# Patient Record
Sex: FEMALE | Race: WHITE | NOT HISPANIC OR LATINO | URBAN - METROPOLITAN AREA
[De-identification: names, ages, dates, MRNs, and addresses within clinical notes are randomized per-mention and may not be internally consistent; named-entity substitution may affect disease eponyms.]

---

## 2023-12-07 ENCOUNTER — LAB (OUTPATIENT)
Dept: LAB | Facility: LAB | Age: 88
End: 2023-12-07
Payer: MEDICARE

## 2023-12-07 DIAGNOSIS — R63.1 INCREASED THIRST: ICD-10-CM

## 2023-12-07 DIAGNOSIS — R06.02 SHORTNESS OF BREATH: ICD-10-CM

## 2023-12-07 DIAGNOSIS — R53.83 OTHER FATIGUE: ICD-10-CM

## 2023-12-07 DIAGNOSIS — I10 HYPERTENSION, ESSENTIAL: ICD-10-CM

## 2023-12-07 DIAGNOSIS — R35.0 URINARY FREQUENCY: ICD-10-CM

## 2023-12-07 LAB
ALBUMIN SERPL BCP-MCNC: 3.8 G/DL (ref 3.4–5)
ALP SERPL-CCNC: 49 U/L (ref 33–136)
ALT SERPL W P-5'-P-CCNC: 10 U/L (ref 7–45)
ANION GAP SERPL CALC-SCNC: 15 MMOL/L (ref 10–20)
AST SERPL W P-5'-P-CCNC: 15 U/L (ref 9–39)
BILIRUB SERPL-MCNC: 0.4 MG/DL (ref 0–1.2)
BUN SERPL-MCNC: 24 MG/DL (ref 6–23)
CALCIUM SERPL-MCNC: 9.5 MG/DL (ref 8.6–10.3)
CHLORIDE SERPL-SCNC: 103 MMOL/L (ref 98–107)
CO2 SERPL-SCNC: 30 MMOL/L (ref 21–32)
CREAT SERPL-MCNC: 0.9 MG/DL (ref 0.5–1.05)
ERYTHROCYTE [DISTWIDTH] IN BLOOD BY AUTOMATED COUNT: 13.6 % (ref 11.5–14.5)
GFR SERPL CREATININE-BSD FRML MDRD: 60 ML/MIN/1.73M*2
GLUCOSE SERPL-MCNC: 115 MG/DL (ref 74–99)
HCT VFR BLD AUTO: 41.3 % (ref 36–46)
HGB BLD-MCNC: 12.8 G/DL (ref 12–16)
MCH RBC QN AUTO: 28.6 PG (ref 26–34)
MCHC RBC AUTO-ENTMCNC: 31 G/DL (ref 32–36)
MCV RBC AUTO: 92 FL (ref 80–100)
NRBC BLD-RTO: 0 /100 WBCS (ref 0–0)
PLATELET # BLD AUTO: 321 X10*3/UL (ref 150–450)
POTASSIUM SERPL-SCNC: 4.5 MMOL/L (ref 3.5–5.3)
PROT SERPL-MCNC: 6.5 G/DL (ref 6.4–8.2)
RBC # BLD AUTO: 4.47 X10*6/UL (ref 4–5.2)
SODIUM SERPL-SCNC: 143 MMOL/L (ref 136–145)
TSH SERPL-ACNC: 1.87 MIU/L (ref 0.44–3.98)
WBC # BLD AUTO: 8.1 X10*3/UL (ref 4.4–11.3)

## 2023-12-07 PROCEDURE — 36415 COLL VENOUS BLD VENIPUNCTURE: CPT

## 2023-12-07 PROCEDURE — 84443 ASSAY THYROID STIM HORMONE: CPT

## 2023-12-07 PROCEDURE — 83036 HEMOGLOBIN GLYCOSYLATED A1C: CPT

## 2023-12-07 PROCEDURE — 85027 COMPLETE CBC AUTOMATED: CPT

## 2023-12-07 PROCEDURE — 80053 COMPREHEN METABOLIC PANEL: CPT

## 2023-12-07 PROCEDURE — 84436 ASSAY OF TOTAL THYROXINE: CPT

## 2023-12-08 LAB
EST. AVERAGE GLUCOSE BLD GHB EST-MCNC: 134 MG/DL
HBA1C MFR BLD: 6.3 %
T4 SERPL-MCNC: 9.6 UG/DL (ref 4.5–11.1)

## 2024-02-19 ENCOUNTER — OFFICE VISIT (OUTPATIENT)
Age: 89
End: 2024-02-19
Payer: MEDICARE

## 2024-02-19 VITALS
BODY MASS INDEX: 30.24 KG/M2 | HEIGHT: 65 IN | DIASTOLIC BLOOD PRESSURE: 76 MMHG | SYSTOLIC BLOOD PRESSURE: 147 MMHG | TEMPERATURE: 97.2 F | WEIGHT: 181.5 LBS | HEART RATE: 62 BPM

## 2024-02-19 DIAGNOSIS — G40.919 INTRACTABLE EPILEPSY WITHOUT STATUS EPILEPTICUS, UNSPECIFIED EPILEPSY TYPE (HCC): ICD-10-CM

## 2024-02-19 DIAGNOSIS — G30.1 MODERATE LATE ONSET ALZHEIMER'S DEMENTIA WITHOUT BEHAVIORAL DISTURBANCE, PSYCHOTIC DISTURBANCE, MOOD DISTURBANCE, OR ANXIETY (HCC): Primary | ICD-10-CM

## 2024-02-19 DIAGNOSIS — R20.0 NUMBNESS IN BOTH LEGS: ICD-10-CM

## 2024-02-19 DIAGNOSIS — F02.B0 MODERATE LATE ONSET ALZHEIMER'S DEMENTIA WITHOUT BEHAVIORAL DISTURBANCE, PSYCHOTIC DISTURBANCE, MOOD DISTURBANCE, OR ANXIETY (HCC): Primary | ICD-10-CM

## 2024-02-19 PROCEDURE — 1123F ACP DISCUSS/DSCN MKR DOCD: CPT | Performed by: PSYCHIATRY & NEUROLOGY

## 2024-02-19 PROCEDURE — 99214 OFFICE O/P EST MOD 30 MIN: CPT | Performed by: PSYCHIATRY & NEUROLOGY

## 2024-02-19 RX ORDER — APIXABAN 2.5 MG/1
2.5 TABLET, FILM COATED ORAL DAILY
COMMUNITY
Start: 2018-12-31

## 2024-02-19 RX ORDER — LEVETIRACETAM 750 MG/1
750 TABLET ORAL 2 TIMES DAILY
COMMUNITY
Start: 2023-06-27 | End: 2024-02-19 | Stop reason: SDUPTHER

## 2024-02-19 RX ORDER — CETIRIZINE HYDROCHLORIDE 10 MG/1
10 TABLET ORAL DAILY
COMMUNITY
Start: 2015-05-20

## 2024-02-19 RX ORDER — NITROFURANTOIN MACROCRYSTALS 100 MG/1
CAPSULE ORAL
COMMUNITY
Start: 2024-01-15

## 2024-02-19 RX ORDER — LEVETIRACETAM 750 MG/1
750 TABLET ORAL 2 TIMES DAILY
Qty: 180 TABLET | Refills: 3 | Status: SHIPPED | OUTPATIENT
Start: 2024-02-19

## 2024-02-19 RX ORDER — MEMANTINE HYDROCHLORIDE AND DONEPEZIL HYDROCHLORIDE 28; 10 MG/1; MG/1
1 CAPSULE ORAL DAILY
COMMUNITY
Start: 2023-06-27 | End: 2024-02-19 | Stop reason: SDUPTHER

## 2024-02-19 RX ORDER — LISINOPRIL AND HYDROCHLOROTHIAZIDE 20; 12.5 MG/1; MG/1
TABLET ORAL
COMMUNITY
Start: 2018-12-05

## 2024-02-19 RX ORDER — AMLODIPINE BESYLATE 5 MG/1
5 TABLET ORAL DAILY
COMMUNITY

## 2024-02-19 RX ORDER — METOPROLOL SUCCINATE 100 MG/1
TABLET, EXTENDED RELEASE ORAL
COMMUNITY
Start: 2018-10-07

## 2024-02-19 RX ORDER — MEMANTINE HYDROCHLORIDE AND DONEPEZIL HYDROCHLORIDE 28; 10 MG/1; MG/1
1 CAPSULE ORAL DAILY
Qty: 90 CAPSULE | Refills: 3 | Status: SHIPPED | OUTPATIENT
Start: 2024-02-19

## 2024-02-19 ASSESSMENT — MINI MENTAL STATE EXAM
WHAT DAY OF THE WEEK IS THIS?: 1
WHAT IS TODAY'S DATE?: 0
WHAT STATE [OR PROVINCE] ARE WE IN?: 1
WHICH SEASON IS THIS?: 0
SAY: PUT THE PAPER DOWN ON THE FLOOR, SCORE IF PAPER IS PLACED BACK ON FLOOR: 1
WHAT MONTH IS THIS?: 0
ASK THE PERSON IF HE IS RIGHT OR LEFT-HANDED. TAKE A PIECE OF PAPER AND HOLD IT UP IN
FRONT OF THE PERSON. SAY: TAKE THIS PAPER IN YOUR RIGHT/LEFT HAND (WHICHEVER IS NON-
DOMINANT), SCORE IF PAPER IS PICKED UP IN CORRECT HAND.: 1
WHAT FLOOR ARE WE ON [IN FACILITY]?/ WHAT ROOM ARE WE IN [IN HOME]?: 1
NOW WHAT WERE THE THREE OBJECTS I ASKED YOU TO REMEMBER?: 0
SAY: I WOULD LIKE YOU TO COUNT BACKWARD FROM 100 BY SEVENS: 4
SHOW: WRISTWATCH [OBJECT] ASK: WHAT IS THIS CALLED?: 1
SAY: FOLD THE PAPER IN HALF ONCE WITH BOTH HANDS, SCORE IF PAPER IS CORRECTLY FOLDED IN HALF.: 1
HAND THE PERSON A PENCIL AND PAPER. SAY: WRITE ANY COMPLETE SENTENCE ON THAT
PIECE OF PAPER. (NOTE: THE SENTENCE MUST MAKE SENSE. IGNORE SPELLING ERRORS): 1
PLACE DESIGN, ERASER AND PENCIL IN FRONT OF THE PERSON.  SAY:  COPY THIS DESIGN PLEASE.  SHOW: DESIGN. ALLOW: MULTIPLE TRIES. WAIT UNTIL PERSON IS FINISHED AND HANDS IT BACK. SCORE: ONLY FOR DIAGRAM WITH 4-SIDED FIGURE BETWEEN TWO 5-SIDED FIGURES: 0
SHOW: PENCIL [OBJECT] ASK: WHAT IS THIS CALLED?: 1
WHAT CITY/TOWN ARE WE IN?: 0
WHAT YEAR IS THIS?: 0
SAY: I WOULD LIKE YOU TO REPEAT THIS PHRASE AFTER ME: NO IFS, ANDS, OR BUTS.: 1
SAY: I AM GOING TO NAME THREE OBJECTS. WHEN I AM FINISHED, I WANT YOU TO REPEAT
THEM. REMEMBER WHAT THEY ARE BECAUSE I AM GOING TO ASK YOU TO NAME THEM AGAIN IN
A FEW MINUTES.  SAY THE FOLLOWING WORDS SLOWLY AT 1-SECOND INTERVALS - BALL/ CAR/ MAN [ITERATIONS FOR REPEAT ADMINISTRATION]: 3
SUM ALL MMSE QUESTIONS FOR TOTAL SCORE [OUT OF 30].: 19
SAY: READ THE WORDS ON THE PAGE AND THEN DO WHAT IT SAYS. THEN HAND THE PERSON
THE SHEET WITH CLOSE YOUR EYES ON IT. IF THE SUBJECT READS AND DOES NOT CLOSE THEIR EYES, REPEAT UP TO THREE TIMES. SCORE ONLY IF SUBJECT CLOSES EYES.: 1
WHAT COUNTRY ARE WE IN?: 1
WHAT IS THE NAME OF THIS BUILDING [IN FACILITY]?/WHAT IS THE STREET ADDRESS OF THIS HOUSE [IN HOME]?: 0

## 2024-02-19 NOTE — PROGRESS NOTES
Tamra Lr MD       Latisha Streeter is a 89 y.o. 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  Appetite better after constipation is fixed.  Getting meals on wheels    Current Medications being taken: namzaric 28/10 mg q daily  Mvi, b12.  Component Latest Ref Rng & Units 8/24/2021  Prealbumin 17.6 - 36.0 mg/dL 30.7    Any Improvement: stable. Playing solitaire  Getting aide- bathing, dressing  Warms food in microwaving    Any Side Effects: NO  Which of the following Activities of Daily Living is the patient able to complete:able to complete activities of daily living- does not want to be groomed except when she steps out of home.  Which of the following Instrumental Activities is the patient able to complete: not driving due to family's request, able to dial a telephone, eats readymade meals mostly, gets meals on meals.  Cameras on the house.  Pill container organized by family and she takes it mostly    Patient lives: independently  Home health comes in 3/week  The patient lives:in a single home  History of Any Mood Changes: normal per patient. On conversation seems short and angry.  History of Any Personality Changes: No  History of Any Problem with Sleep: not using cpap.  - sleeping ok     History of Any Visual Hallucination: no  History of Any Weakness:None  History of Any Numbness:c/o tingling in feet  History of Seizures: yes.  History of Tremors:None  History of Rigidity:None.  Weight stable.  Falls: none      Epilepsy:  Date of last seizure: possibly in may 2014  Unsure if shes taking the keppra or not    Breakthrough episode: ?? On May 10th 2015

## 2024-02-20 LAB
FOLATE: 18.6 NG/ML (ref 4.8–24.2)
VITAMIN B-12: >2000 PG/ML (ref 211–946)

## 2024-02-21 LAB
ALBUMIN (CALCULATED): 3.2 G/DL (ref 3.5–4.7)
ALPHA-1-GLOBULIN: 0.3 G/DL (ref 0.2–0.4)
ALPHA-2-GLOBULIN: 0.9 G/DL (ref 0.5–1)
BETA GLOBULIN: 1.3 G/DL (ref 0.8–1.3)
GAMMA GLOBULIN: 1 G/DL (ref 0.7–1.6)
KEPPRA: 28 UG/ML
M SPIKE 1 SERUM PROTEIN ELEC: 0.3 G/DL
PATHOLOGIST REVIEW: NORMAL
PATHOLOGIST: ABNORMAL
PROTEIN ELECTROPHORESIS, SERUM: ABNORMAL
SERUM IFX INTERP: NORMAL
TOTAL PROTEIN: 6.7 G/DL (ref 6.4–8.3)

## 2024-02-23 LAB — VITAMIN B1 WHOLE BLOOD: 141 NMOL/L (ref 70–180)

## 2024-03-11 ENCOUNTER — TELEPHONE (OUTPATIENT)
Age: 89
End: 2024-03-11

## 2024-03-11 DIAGNOSIS — D47.2 MGUS (MONOCLONAL GAMMOPATHY OF UNKNOWN SIGNIFICANCE): Primary | ICD-10-CM

## 2024-07-24 ENCOUNTER — HOSPITAL ENCOUNTER (OUTPATIENT)
Dept: RADIOLOGY | Facility: HOSPITAL | Age: 89
Discharge: HOME | End: 2024-07-24
Payer: MEDICARE

## 2024-07-24 DIAGNOSIS — R06.00 DYSPNEA: ICD-10-CM

## 2024-07-24 PROCEDURE — 71046 X-RAY EXAM CHEST 2 VIEWS: CPT | Performed by: RADIOLOGY

## 2024-07-24 PROCEDURE — 71046 X-RAY EXAM CHEST 2 VIEWS: CPT

## 2024-08-08 ENCOUNTER — HOSPITAL ENCOUNTER (INPATIENT)
Facility: HOSPITAL | Age: 89
End: 2024-08-08
Attending: EMERGENCY MEDICINE | Admitting: INTERNAL MEDICINE
Payer: MEDICARE

## 2024-08-08 ENCOUNTER — APPOINTMENT (OUTPATIENT)
Dept: CARDIOLOGY | Facility: HOSPITAL | Age: 89
End: 2024-08-08
Payer: MEDICARE

## 2024-08-08 ENCOUNTER — APPOINTMENT (OUTPATIENT)
Dept: RADIOLOGY | Facility: HOSPITAL | Age: 89
End: 2024-08-08
Payer: MEDICARE

## 2024-08-08 DIAGNOSIS — J18.9 COMMUNITY ACQUIRED PNEUMONIA, UNSPECIFIED LATERALITY: ICD-10-CM

## 2024-08-08 DIAGNOSIS — K92.2 LOWER GI BLEEDING: ICD-10-CM

## 2024-08-08 DIAGNOSIS — J18.9 SEVERE PNEUMONIA: Primary | ICD-10-CM

## 2024-08-08 DIAGNOSIS — K52.9 COLITIS: ICD-10-CM

## 2024-08-08 DIAGNOSIS — R53.1 GENERALIZED WEAKNESS: ICD-10-CM

## 2024-08-08 DIAGNOSIS — J18.9 PNEUMONIA DUE TO ORGANISM: ICD-10-CM

## 2024-08-08 LAB
ALBUMIN SERPL BCP-MCNC: 3.8 G/DL (ref 3.4–5)
ALP SERPL-CCNC: 48 U/L (ref 33–136)
ALT SERPL W P-5'-P-CCNC: 10 U/L (ref 7–45)
ANION GAP BLDV CALCULATED.4IONS-SCNC: 7 MMOL/L (ref 10–25)
ANION GAP SERPL CALC-SCNC: 14 MMOL/L (ref 10–20)
AST SERPL W P-5'-P-CCNC: 18 U/L (ref 9–39)
BASE EXCESS BLDV CALC-SCNC: 3.2 MMOL/L (ref -2–3)
BASOPHILS # BLD AUTO: 0.04 X10*3/UL (ref 0–0.1)
BASOPHILS NFR BLD AUTO: 0.5 %
BILIRUB SERPL-MCNC: 0.6 MG/DL (ref 0–1.2)
BNP SERPL-MCNC: 27 PG/ML (ref 0–99)
BODY TEMPERATURE: ABNORMAL
BUN SERPL-MCNC: 27 MG/DL (ref 6–23)
CA-I BLDV-SCNC: 1.23 MMOL/L (ref 1.1–1.33)
CALCIUM SERPL-MCNC: 9.8 MG/DL (ref 8.6–10.3)
CARDIAC TROPONIN I PNL SERPL HS: 7 NG/L (ref 0–13)
CHLORIDE BLDV-SCNC: 103 MMOL/L (ref 98–107)
CHLORIDE SERPL-SCNC: 99 MMOL/L (ref 98–107)
CO2 SERPL-SCNC: 25 MMOL/L (ref 21–32)
CREAT SERPL-MCNC: 1.19 MG/DL (ref 0.5–1.05)
EGFRCR SERPLBLD CKD-EPI 2021: 42 ML/MIN/1.73M*2
EOSINOPHIL # BLD AUTO: 0.09 X10*3/UL (ref 0–0.4)
EOSINOPHIL NFR BLD AUTO: 1.1 %
ERYTHROCYTE [DISTWIDTH] IN BLOOD BY AUTOMATED COUNT: 13.8 % (ref 11.5–14.5)
GLUCOSE BLD MANUAL STRIP-MCNC: 169 MG/DL (ref 74–99)
GLUCOSE BLDV-MCNC: 193 MG/DL (ref 74–99)
GLUCOSE SERPL-MCNC: 211 MG/DL (ref 74–99)
HCO3 BLDV-SCNC: 30.2 MMOL/L (ref 22–26)
HCT VFR BLD AUTO: 40.3 % (ref 36–46)
HCT VFR BLD EST: 38 % (ref 36–46)
HGB BLD-MCNC: 12.9 G/DL (ref 12–16)
HGB BLDV-MCNC: 12.8 G/DL (ref 12–16)
HOLD SPECIMEN: NORMAL
IMM GRANULOCYTES # BLD AUTO: 0.02 X10*3/UL (ref 0–0.5)
IMM GRANULOCYTES NFR BLD AUTO: 0.3 % (ref 0–0.9)
INHALED O2 CONCENTRATION: 0 %
LACTATE BLDV-SCNC: 2.4 MMOL/L (ref 0.4–2)
LYMPHOCYTES # BLD AUTO: 3.77 X10*3/UL (ref 0.8–3)
LYMPHOCYTES NFR BLD AUTO: 47.1 %
MCH RBC QN AUTO: 28.2 PG (ref 26–34)
MCHC RBC AUTO-ENTMCNC: 32 G/DL (ref 32–36)
MCV RBC AUTO: 88 FL (ref 80–100)
MONOCYTES # BLD AUTO: 0.4 X10*3/UL (ref 0.05–0.8)
MONOCYTES NFR BLD AUTO: 5 %
NEUTROPHILS # BLD AUTO: 3.68 X10*3/UL (ref 1.6–5.5)
NEUTROPHILS NFR BLD AUTO: 46 %
NRBC BLD-RTO: 0 /100 WBCS (ref 0–0)
OXYHGB MFR BLDV: 43.8 % (ref 45–75)
PCO2 BLDV: 56 MM HG (ref 41–51)
PH BLDV: 7.34 PH (ref 7.33–7.43)
PLATELET # BLD AUTO: 329 X10*3/UL (ref 150–450)
PO2 BLDV: 31 MM HG (ref 35–45)
POTASSIUM BLDV-SCNC: 4.3 MMOL/L (ref 3.5–5.3)
POTASSIUM SERPL-SCNC: 3.8 MMOL/L (ref 3.5–5.3)
PROT SERPL-MCNC: 7.2 G/DL (ref 6.4–8.2)
RBC # BLD AUTO: 4.57 X10*6/UL (ref 4–5.2)
SAO2 % BLDV: 45 % (ref 45–75)
SODIUM BLDV-SCNC: 136 MMOL/L (ref 136–145)
SODIUM SERPL-SCNC: 134 MMOL/L (ref 136–145)
WBC # BLD AUTO: 8 X10*3/UL (ref 4.4–11.3)

## 2024-08-08 PROCEDURE — 84132 ASSAY OF SERUM POTASSIUM: CPT | Performed by: EMERGENCY MEDICINE

## 2024-08-08 PROCEDURE — 36415 COLL VENOUS BLD VENIPUNCTURE: CPT | Performed by: EMERGENCY MEDICINE

## 2024-08-08 PROCEDURE — 2500000004 HC RX 250 GENERAL PHARMACY W/ HCPCS (ALT 636 FOR OP/ED): Performed by: EMERGENCY MEDICINE

## 2024-08-08 PROCEDURE — 71045 X-RAY EXAM CHEST 1 VIEW: CPT | Mod: FOREIGN READ | Performed by: RADIOLOGY

## 2024-08-08 PROCEDURE — 83880 ASSAY OF NATRIURETIC PEPTIDE: CPT | Performed by: EMERGENCY MEDICINE

## 2024-08-08 PROCEDURE — 85025 COMPLETE CBC W/AUTO DIFF WBC: CPT | Performed by: EMERGENCY MEDICINE

## 2024-08-08 PROCEDURE — 80053 COMPREHEN METABOLIC PANEL: CPT | Performed by: EMERGENCY MEDICINE

## 2024-08-08 PROCEDURE — 71045 X-RAY EXAM CHEST 1 VIEW: CPT

## 2024-08-08 PROCEDURE — 71260 CT THORAX DX C+: CPT

## 2024-08-08 PROCEDURE — 84484 ASSAY OF TROPONIN QUANT: CPT | Performed by: EMERGENCY MEDICINE

## 2024-08-08 PROCEDURE — 82947 ASSAY GLUCOSE BLOOD QUANT: CPT

## 2024-08-08 PROCEDURE — 93005 ELECTROCARDIOGRAM TRACING: CPT

## 2024-08-08 PROCEDURE — 99285 EMERGENCY DEPT VISIT HI MDM: CPT

## 2024-08-08 PROCEDURE — 74177 CT ABD & PELVIS W/CONTRAST: CPT

## 2024-08-08 RX ADMIN — SODIUM CHLORIDE 1000 ML: 9 INJECTION, SOLUTION INTRAVENOUS at 22:24

## 2024-08-08 ASSESSMENT — COLUMBIA-SUICIDE SEVERITY RATING SCALE - C-SSRS
1. IN THE PAST MONTH, HAVE YOU WISHED YOU WERE DEAD OR WISHED YOU COULD GO TO SLEEP AND NOT WAKE UP?: NO
6. HAVE YOU EVER DONE ANYTHING, STARTED TO DO ANYTHING, OR PREPARED TO DO ANYTHING TO END YOUR LIFE?: NO
2. HAVE YOU ACTUALLY HAD ANY THOUGHTS OF KILLING YOURSELF?: NO

## 2024-08-08 ASSESSMENT — PAIN - FUNCTIONAL ASSESSMENT: PAIN_FUNCTIONAL_ASSESSMENT: 0-10

## 2024-08-08 ASSESSMENT — PAIN SCALES - GENERAL: PAINLEVEL_OUTOF10: 0 - NO PAIN

## 2024-08-09 PROBLEM — J18.9 PNEUMONIA DUE TO ORGANISM: Status: ACTIVE | Noted: 2024-08-09

## 2024-08-09 LAB
ANION GAP SERPL CALC-SCNC: 13 MMOL/L (ref 10–20)
BNP SERPL-MCNC: 68 PG/ML (ref 0–99)
BUN SERPL-MCNC: 33 MG/DL (ref 6–23)
CALCIUM SERPL-MCNC: 8.2 MG/DL (ref 8.6–10.3)
CHLORIDE SERPL-SCNC: 101 MMOL/L (ref 98–107)
CO2 SERPL-SCNC: 25 MMOL/L (ref 21–32)
CREAT SERPL-MCNC: 1.3 MG/DL (ref 0.5–1.05)
EGFRCR SERPLBLD CKD-EPI 2021: 38 ML/MIN/1.73M*2
ERYTHROCYTE [DISTWIDTH] IN BLOOD BY AUTOMATED COUNT: 13.9 % (ref 11.5–14.5)
GLUCOSE SERPL-MCNC: 321 MG/DL (ref 74–99)
HCT VFR BLD AUTO: 39.5 % (ref 36–46)
HEMOCCULT SP1 STL QL: POSITIVE
HGB BLD-MCNC: 12.3 G/DL (ref 12–16)
LACTATE SERPL-SCNC: 3.1 MMOL/L (ref 0.4–2)
LACTATE SERPL-SCNC: 4.2 MMOL/L (ref 0.4–2)
MAGNESIUM SERPL-MCNC: 1.62 MG/DL (ref 1.6–2.4)
MCH RBC QN AUTO: 28 PG (ref 26–34)
MCHC RBC AUTO-ENTMCNC: 31.1 G/DL (ref 32–36)
MCV RBC AUTO: 90 FL (ref 80–100)
NRBC BLD-RTO: 0 /100 WBCS (ref 0–0)
PLATELET # BLD AUTO: 285 X10*3/UL (ref 150–450)
POTASSIUM SERPL-SCNC: 3.9 MMOL/L (ref 3.5–5.3)
PROCALCITONIN SERPL-MCNC: 0.91 NG/ML
RBC # BLD AUTO: 4.39 X10*6/UL (ref 4–5.2)
SODIUM SERPL-SCNC: 135 MMOL/L (ref 136–145)
WBC # BLD AUTO: 13 X10*3/UL (ref 4.4–11.3)

## 2024-08-09 PROCEDURE — 85027 COMPLETE CBC AUTOMATED: CPT | Mod: IPSPLIT

## 2024-08-09 PROCEDURE — 83880 ASSAY OF NATRIURETIC PEPTIDE: CPT | Mod: IPSPLIT

## 2024-08-09 PROCEDURE — 83735 ASSAY OF MAGNESIUM: CPT | Mod: IPSPLIT

## 2024-08-09 PROCEDURE — 71260 CT THORAX DX C+: CPT | Mod: FOREIGN READ | Performed by: RADIOLOGY

## 2024-08-09 PROCEDURE — 87040 BLOOD CULTURE FOR BACTERIA: CPT | Mod: CONLAB

## 2024-08-09 PROCEDURE — 2500000004 HC RX 250 GENERAL PHARMACY W/ HCPCS (ALT 636 FOR OP/ED): Mod: IPSPLIT | Performed by: NURSE PRACTITIONER

## 2024-08-09 PROCEDURE — 2550000001 HC RX 255 CONTRASTS: Performed by: EMERGENCY MEDICINE

## 2024-08-09 PROCEDURE — 94664 DEMO&/EVAL PT USE INHALER: CPT | Mod: IPSPLIT

## 2024-08-09 PROCEDURE — C9113 INJ PANTOPRAZOLE SODIUM, VIA: HCPCS

## 2024-08-09 PROCEDURE — 1100000001 HC PRIVATE ROOM DAILY: Mod: IPSPLIT

## 2024-08-09 PROCEDURE — 94760 N-INVAS EAR/PLS OXIMETRY 1: CPT | Mod: IPSPLIT

## 2024-08-09 PROCEDURE — 94640 AIRWAY INHALATION TREATMENT: CPT | Mod: IPSPLIT

## 2024-08-09 PROCEDURE — C9113 INJ PANTOPRAZOLE SODIUM, VIA: HCPCS | Mod: IPSPLIT

## 2024-08-09 PROCEDURE — 2500000004 HC RX 250 GENERAL PHARMACY W/ HCPCS (ALT 636 FOR OP/ED): Performed by: EMERGENCY MEDICINE

## 2024-08-09 PROCEDURE — 2500000002 HC RX 250 W HCPCS SELF ADMINISTERED DRUGS (ALT 637 FOR MEDICARE OP, ALT 636 FOR OP/ED): Mod: IPSPLIT | Performed by: INTERNAL MEDICINE

## 2024-08-09 PROCEDURE — 74177 CT ABD & PELVIS W/CONTRAST: CPT | Mod: FOREIGN READ | Performed by: RADIOLOGY

## 2024-08-09 PROCEDURE — 36415 COLL VENOUS BLD VENIPUNCTURE: CPT | Mod: IPSPLIT

## 2024-08-09 PROCEDURE — 82270 OCCULT BLOOD FECES: CPT | Performed by: EMERGENCY MEDICINE

## 2024-08-09 PROCEDURE — 83605 ASSAY OF LACTIC ACID: CPT | Mod: IPSPLIT

## 2024-08-09 PROCEDURE — 80048 BASIC METABOLIC PNL TOTAL CA: CPT | Mod: IPSPLIT

## 2024-08-09 PROCEDURE — 96374 THER/PROPH/DIAG INJ IV PUSH: CPT | Mod: 59,IPSPLIT

## 2024-08-09 PROCEDURE — 96375 TX/PRO/DX INJ NEW DRUG ADDON: CPT | Mod: IPSPLIT

## 2024-08-09 PROCEDURE — 96365 THER/PROPH/DIAG IV INF INIT: CPT | Mod: IPSPLIT

## 2024-08-09 PROCEDURE — 97162 PT EVAL MOD COMPLEX 30 MIN: CPT | Mod: GP,IPSPLIT

## 2024-08-09 PROCEDURE — 2500000004 HC RX 250 GENERAL PHARMACY W/ HCPCS (ALT 636 FOR OP/ED)

## 2024-08-09 PROCEDURE — 99222 1ST HOSP IP/OBS MODERATE 55: CPT | Performed by: NURSE PRACTITIONER

## 2024-08-09 PROCEDURE — 2500000004 HC RX 250 GENERAL PHARMACY W/ HCPCS (ALT 636 FOR OP/ED): Mod: IPSPLIT

## 2024-08-09 PROCEDURE — 9420000001 HC RT PATIENT EDUCATION 5 MIN: Mod: IPSPLIT

## 2024-08-09 PROCEDURE — 84145 PROCALCITONIN (PCT): CPT | Mod: CONLAB

## 2024-08-09 RX ORDER — IPRATROPIUM BROMIDE AND ALBUTEROL SULFATE 2.5; .5 MG/3ML; MG/3ML
3 SOLUTION RESPIRATORY (INHALATION) EVERY 2 HOUR PRN
Status: ACTIVE | OUTPATIENT
Start: 2024-08-09

## 2024-08-09 RX ORDER — SODIUM CHLORIDE, SODIUM LACTATE, POTASSIUM CHLORIDE, CALCIUM CHLORIDE 600; 310; 30; 20 MG/100ML; MG/100ML; MG/100ML; MG/100ML
75 INJECTION, SOLUTION INTRAVENOUS CONTINUOUS
Status: DISCONTINUED | OUTPATIENT
Start: 2024-08-09 | End: 2024-08-11

## 2024-08-09 RX ORDER — SODIUM CHLORIDE 9 MG/ML
INJECTION, SOLUTION INTRAVENOUS
Status: COMPLETED
Start: 2024-08-09 | End: 2024-08-09

## 2024-08-09 RX ORDER — AMLODIPINE BESYLATE 5 MG/1
5 TABLET ORAL
Status: ON HOLD | COMMUNITY

## 2024-08-09 RX ORDER — LISINOPRIL AND HYDROCHLOROTHIAZIDE 12.5; 2 MG/1; MG/1
1 TABLET ORAL DAILY
Status: ON HOLD | COMMUNITY

## 2024-08-09 RX ORDER — APIXABAN 2.5 MG/1
2.5 TABLET, FILM COATED ORAL 2 TIMES DAILY
Status: ON HOLD | COMMUNITY
Start: 2024-05-09

## 2024-08-09 RX ORDER — CEFTRIAXONE 2 G/50ML
2 INJECTION, SOLUTION INTRAVENOUS EVERY 24 HOURS
Status: DISCONTINUED | OUTPATIENT
Start: 2024-08-09 | End: 2024-08-11

## 2024-08-09 RX ORDER — AZITHROMYCIN 250 MG/1
500 TABLET, FILM COATED ORAL
Status: DISPENSED | OUTPATIENT
Start: 2024-08-10 | End: 2024-08-13

## 2024-08-09 RX ORDER — PANTOPRAZOLE SODIUM 40 MG/10ML
INJECTION, POWDER, LYOPHILIZED, FOR SOLUTION INTRAVENOUS
Status: COMPLETED
Start: 2024-08-09 | End: 2024-08-09

## 2024-08-09 RX ORDER — CEFTRIAXONE 1 G/50ML
1 INJECTION, SOLUTION INTRAVENOUS ONCE
Status: COMPLETED | OUTPATIENT
Start: 2024-08-09 | End: 2024-08-09

## 2024-08-09 RX ORDER — PANTOPRAZOLE SODIUM 40 MG/10ML
40 INJECTION, POWDER, LYOPHILIZED, FOR SOLUTION INTRAVENOUS DAILY
Status: DISPENSED | OUTPATIENT
Start: 2024-08-09

## 2024-08-09 RX ORDER — LEVETIRACETAM 750 MG/1
1 TABLET ORAL 2 TIMES DAILY
Status: ON HOLD | COMMUNITY
Start: 2023-06-27

## 2024-08-09 RX ORDER — METOPROLOL SUCCINATE 100 MG/1
1 TABLET, EXTENDED RELEASE ORAL DAILY
Status: ON HOLD | COMMUNITY
Start: 2016-01-14

## 2024-08-09 RX ORDER — IPRATROPIUM BROMIDE AND ALBUTEROL SULFATE 2.5; .5 MG/3ML; MG/3ML
3 SOLUTION RESPIRATORY (INHALATION)
Status: DISCONTINUED | OUTPATIENT
Start: 2024-08-09 | End: 2024-08-09

## 2024-08-09 RX ORDER — MEMANTINE HYDROCHLORIDE 10 MG/1
10 TABLET ORAL 2 TIMES DAILY
Status: ON HOLD | COMMUNITY
Start: 2016-01-14

## 2024-08-09 RX ORDER — DONEPEZIL HYDROCHLORIDE 10 MG/1
1 TABLET, FILM COATED ORAL DAILY
Status: ON HOLD | COMMUNITY
Start: 2016-01-14

## 2024-08-09 RX ORDER — METRONIDAZOLE 500 MG/100ML
500 INJECTION, SOLUTION INTRAVENOUS EVERY 8 HOURS
Status: DISCONTINUED | OUTPATIENT
Start: 2024-08-09 | End: 2024-08-11

## 2024-08-09 RX ORDER — IPRATROPIUM BROMIDE AND ALBUTEROL SULFATE 2.5; .5 MG/3ML; MG/3ML
3 SOLUTION RESPIRATORY (INHALATION) 3 TIMES DAILY
Status: DISCONTINUED | OUTPATIENT
Start: 2024-08-09 | End: 2024-08-10

## 2024-08-09 RX ADMIN — METRONIDAZOLE 500 MG: 5 INJECTION, SOLUTION INTRAVENOUS at 15:17

## 2024-08-09 RX ADMIN — CEFTRIAXONE 1 G: 1 INJECTION, SOLUTION INTRAVENOUS at 02:56

## 2024-08-09 RX ADMIN — AZITHROMYCIN MONOHYDRATE 500 MG: 500 INJECTION, POWDER, LYOPHILIZED, FOR SOLUTION INTRAVENOUS at 05:38

## 2024-08-09 RX ADMIN — METRONIDAZOLE 500 MG: 5 INJECTION, SOLUTION INTRAVENOUS at 23:10

## 2024-08-09 RX ADMIN — METHYLPREDNISOLONE SODIUM SUCCINATE 125 MG: 125 INJECTION, POWDER, FOR SOLUTION INTRAMUSCULAR; INTRAVENOUS at 02:56

## 2024-08-09 RX ADMIN — METHYLPREDNISOLONE SODIUM SUCCINATE 40 MG: 40 INJECTION, POWDER, FOR SOLUTION INTRAMUSCULAR; INTRAVENOUS at 18:16

## 2024-08-09 RX ADMIN — METHYLPREDNISOLONE SODIUM SUCCINATE 40 MG: 40 INJECTION, POWDER, FOR SOLUTION INTRAMUSCULAR; INTRAVENOUS at 10:33

## 2024-08-09 RX ADMIN — PANTOPRAZOLE SODIUM 40 MG: 40 INJECTION, POWDER, FOR SOLUTION INTRAVENOUS at 03:43

## 2024-08-09 RX ADMIN — IPRATROPIUM BROMIDE AND ALBUTEROL SULFATE 3 ML: .5; 3 SOLUTION RESPIRATORY (INHALATION) at 21:06

## 2024-08-09 RX ADMIN — IPRATROPIUM BROMIDE AND ALBUTEROL SULFATE 3 ML: .5; 3 SOLUTION RESPIRATORY (INHALATION) at 15:45

## 2024-08-09 RX ADMIN — SODIUM CHLORIDE, POTASSIUM CHLORIDE, SODIUM LACTATE AND CALCIUM CHLORIDE 75 ML/HR: 600; 310; 30; 20 INJECTION, SOLUTION INTRAVENOUS at 10:34

## 2024-08-09 RX ADMIN — SODIUM CHLORIDE, POTASSIUM CHLORIDE, SODIUM LACTATE AND CALCIUM CHLORIDE 75 ML/HR: 600; 310; 30; 20 INJECTION, SOLUTION INTRAVENOUS at 23:10

## 2024-08-09 RX ADMIN — SODIUM CHLORIDE, POTASSIUM CHLORIDE, SODIUM LACTATE AND CALCIUM CHLORIDE 75 ML/HR: 600; 310; 30; 20 INJECTION, SOLUTION INTRAVENOUS at 09:45

## 2024-08-09 RX ADMIN — IPRATROPIUM BROMIDE AND ALBUTEROL SULFATE 3 ML: .5; 3 SOLUTION RESPIRATORY (INHALATION) at 08:59

## 2024-08-09 RX ADMIN — SODIUM CHLORIDE, SODIUM LACTATE, POTASSIUM CHLORIDE, AND CALCIUM CHLORIDE 500 ML: 600; 310; 30; 20 INJECTION, SOLUTION INTRAVENOUS at 12:30

## 2024-08-09 RX ADMIN — METRONIDAZOLE 500 MG: 5 INJECTION, SOLUTION INTRAVENOUS at 07:58

## 2024-08-09 RX ADMIN — IOHEXOL 75 ML: 350 INJECTION, SOLUTION INTRAVENOUS at 00:05

## 2024-08-09 RX ADMIN — CEFTRIAXONE 2 G: 2 INJECTION, SOLUTION INTRAVENOUS at 13:00

## 2024-08-09 RX ADMIN — PANTOPRAZOLE SODIUM 40 MG: 40 INJECTION, POWDER, FOR SOLUTION INTRAVENOUS at 09:00

## 2024-08-09 SDOH — SOCIAL STABILITY: SOCIAL INSECURITY: ARE THERE ANY APPARENT SIGNS OF INJURIES/BEHAVIORS THAT COULD BE RELATED TO ABUSE/NEGLECT?: NO

## 2024-08-09 SDOH — SOCIAL STABILITY: SOCIAL INSECURITY: ARE YOU OR HAVE YOU BEEN THREATENED OR ABUSED PHYSICALLY, EMOTIONALLY, OR SEXUALLY BY ANYONE?: NO

## 2024-08-09 SDOH — SOCIAL STABILITY: SOCIAL INSECURITY: DOES ANYONE TRY TO KEEP YOU FROM HAVING/CONTACTING OTHER FRIENDS OR DOING THINGS OUTSIDE YOUR HOME?: NO

## 2024-08-09 SDOH — SOCIAL STABILITY: SOCIAL INSECURITY: ABUSE: ADULT

## 2024-08-09 SDOH — SOCIAL STABILITY: SOCIAL INSECURITY: HAS ANYONE EVER THREATENED TO HURT YOUR FAMILY OR YOUR PETS?: NO

## 2024-08-09 SDOH — SOCIAL STABILITY: SOCIAL INSECURITY: HAVE YOU HAD THOUGHTS OF HARMING ANYONE ELSE?: NO

## 2024-08-09 SDOH — SOCIAL STABILITY: SOCIAL INSECURITY: WERE YOU ABLE TO COMPLETE ALL THE BEHAVIORAL HEALTH SCREENINGS?: YES

## 2024-08-09 SDOH — SOCIAL STABILITY: SOCIAL INSECURITY: DO YOU FEEL UNSAFE GOING BACK TO THE PLACE WHERE YOU ARE LIVING?: NO

## 2024-08-09 SDOH — SOCIAL STABILITY: SOCIAL INSECURITY: HAVE YOU HAD ANY THOUGHTS OF HARMING ANYONE ELSE?: NO

## 2024-08-09 SDOH — SOCIAL STABILITY: SOCIAL INSECURITY: DO YOU FEEL ANYONE HAS EXPLOITED OR TAKEN ADVANTAGE OF YOU FINANCIALLY OR OF YOUR PERSONAL PROPERTY?: NO

## 2024-08-09 ASSESSMENT — COGNITIVE AND FUNCTIONAL STATUS - GENERAL
PATIENT BASELINE BEDBOUND: UNABLE TO ASSESS AT THIS TIME
DAILY ACTIVITIY SCORE: 24
MOBILITY SCORE: 22
MOBILITY SCORE: 20
CLIMB 3 TO 5 STEPS WITH RAILING: A LITTLE
WALKING IN HOSPITAL ROOM: A LITTLE
MOVING TO AND FROM BED TO CHAIR: A LITTLE
CLIMB 3 TO 5 STEPS WITH RAILING: A LITTLE
STANDING UP FROM CHAIR USING ARMS: A LITTLE
WALKING IN HOSPITAL ROOM: A LITTLE

## 2024-08-09 ASSESSMENT — ACTIVITIES OF DAILY LIVING (ADL)
LACK_OF_TRANSPORTATION: PATIENT UNABLE TO ANSWER
DRESSING YOURSELF: NEEDS ASSISTANCE
HEARING - LEFT EAR: DIFFICULTY WITH NOISE
HEARING - RIGHT EAR: DIFFICULTY WITH NOISE
PATIENT'S MEMORY ADEQUATE TO SAFELY COMPLETE DAILY ACTIVITIES?: NO
BATHING: NEEDS ASSISTANCE
ADEQUATE_TO_COMPLETE_ADL: NO
WALKS IN HOME: NEEDS ASSISTANCE
JUDGMENT_ADEQUATE_SAFELY_COMPLETE_DAILY_ACTIVITIES: NO
TOILETING: NEEDS ASSISTANCE
FEEDING YOURSELF: NEEDS ASSISTANCE
ASSISTIVE_DEVICE: EYEGLASSES
GROOMING: NEEDS ASSISTANCE

## 2024-08-09 ASSESSMENT — PATIENT HEALTH QUESTIONNAIRE - PHQ9
2. FEELING DOWN, DEPRESSED OR HOPELESS: NOT AT ALL
SUM OF ALL RESPONSES TO PHQ9 QUESTIONS 1 & 2: 0
1. LITTLE INTEREST OR PLEASURE IN DOING THINGS: NOT AT ALL

## 2024-08-09 ASSESSMENT — LIFESTYLE VARIABLES
HOW OFTEN DO YOU HAVE A DRINK CONTAINING ALCOHOL: NEVER
AUDIT-C TOTAL SCORE: 0
HOW MANY STANDARD DRINKS CONTAINING ALCOHOL DO YOU HAVE ON A TYPICAL DAY: PATIENT DOES NOT DRINK
SKIP TO QUESTIONS 9-10: 1
HOW OFTEN DO YOU HAVE 6 OR MORE DRINKS ON ONE OCCASION: NEVER
AUDIT-C TOTAL SCORE: 0

## 2024-08-09 ASSESSMENT — PAIN SCALES - GENERAL
PAINLEVEL_OUTOF10: 0 - NO PAIN

## 2024-08-09 ASSESSMENT — PAIN - FUNCTIONAL ASSESSMENT
PAIN_FUNCTIONAL_ASSESSMENT: 0-10
PAIN_FUNCTIONAL_ASSESSMENT: 0-10

## 2024-08-09 NOTE — CARE PLAN
The patient's goals for the shift include  remain hemodynamically stable    The clinical goals for the shift include Pt will have no BM this shift    Over the shift, the patient did not make progress toward the following goals. Barriers to progression include short term memory. Recommendations to address these barriers include re-orient and follow plan of care..

## 2024-08-09 NOTE — CONSULTS
"Nutrition Initial Assessment:   Nutrition Assessment    Reason for Assessment: Admission nursing screening (MST=2; unsure of wt loss)    Patient is a 89 y.o. female presenting to the ED due to progressive weakness over the past several days and passing out. ED workup revealed bilateral lower lobe pneumonia and colitis.    PMH: Alzheimer's dementia, HTN, pacemaker, seizures, anemia, CAD s/p stent, HLD     Nutrition History:  Food and Nutrient History: Pt visited in room, daughter at bedside. Pt confused, told me she ate breakfast, then states she didn't eat breakfast because her daughter didn't bring her any. Per daughter, pt forgot she was in the hospital. Pt unable to order lunch, nursing ordered pt a tray, daughter at bedside to assist pt with meal. Pt's daughter states pt lives home alone, was eating well prior to admission, confused at baseline, daughter lives close by. Pt agrees to receiving ONS on trays.    Food Allergies/Intolerances:   eggshell membrane and wheat  GI Symptoms: Diarrhea  Oral Problems:  pt only has 3 teeth     Anthropometrics:  Height: 165.1 cm (5' 5\")   Weight: 83 kg (182 lb 15.7 oz)   BMI (Calculated): 30.45  IBW/kg (Dietitian Calculated): 62 kg  Percent of IBW: 134 %     Weight History:   Wt Readings from Last 10 Encounters:   08/08/24 83 kg (182 lb 15.7 oz)   10/17/22 78.5 kg (173 lb 1 oz)      Weight Change %:  Weight History / % Weight Change: 08/08/24 - 83 kg; 10/17/22 - 78.5 kg  Significant Weight Loss: No    Nutrition Focused Physical Exam Findings:  defer: pt with confusion / dementia    Physical Findings:  Skin: Negative    Nutrition Significant Labs:  BMP Trend:   Results from last 7 days   Lab Units 08/09/24  0612 08/08/24  2222   GLUCOSE mg/dL 321* 211*   CALCIUM mg/dL 8.2* 9.8   SODIUM mmol/L 135* 134*   POTASSIUM mmol/L 3.9 3.8   CO2 mmol/L 25 25   CHLORIDE mmol/L 101 99   BUN mg/dL 33* 27*   CREATININE mg/dL 1.30* 1.19*      Nutrition Specific Medications:  methylPREDNISolone " sodium succinate (PF), 40 mg, intravenous, q8h  metroNIDAZOLE, 500 mg, intravenous, q8h  pantoprazole, 40 mg, intravenous, Daily    I/O:   Last BM Date: 08/09/24; Stool Appearance: Loose (08/09/24 0957)    Dietary Orders (From admission, onward)       Start     Ordered    08/09/24 0406  Adult diet Cardiac; 70 gm fat; 2 - 3 grams Sodium  Diet effective now        Question Answer Comment   Diet type Cardiac    Fat restriction: 70 gm fat    Sodium restriction: 2 - 3 grams Sodium        08/09/24 0405                     Estimated Needs:   Total Energy Estimated Needs (kCal): 1700 kCal (4469-6113 Kcal/day)  Method for Estimating Needs: 25-30 Kcal/kg IBW  Total Protein Estimated Needs (g): 62 g  Method for Estimating Needs: 1.0 gm/kg IBW  Total Fluid Estimated Needs (mL): 1700 mL  Method for Estimating Needs: 1 mL/Kcal        Nutrition Diagnosis   Malnutrition Diagnosis  Patient has Malnutrition Diagnosis: No    Nutrition Diagnosis  Patient has Nutrition Diagnosis: Yes  Diagnosis Status (1): New  Nutrition Diagnosis 1: Inadequate oral intake  Related to (1): dementia/ increased confusion with illness  As Evidenced by (1): Pt with alzheimers dementia, %PO 0% of meals from time of admission  Additional Assessment Information (1): Daughter currently present to encourage intake    Additional Nutrition Diagnosis: Diagnosis 2  Diagnosis Status (2): New  Nutrition Diagnosis 2: Altered nutrition related to laboratory values  Related to (2): endocrine dysfunction vs steroid induced hyperglycemia  As Evidenced by (2): pt ordered methylprednisolone; glucose 211-321^       Nutrition Interventions/Recommendations         Nutrition Prescription:  Individualized Nutrition Prescription Provided for : diet, fluids, ONS        Nutrition Interventions:   Interventions: Meals and snacks, Medical food supplement  Meals and Snacks: Texture-modified diet, General healthful diet  Goal: liberalized diet; easy to chew foods (poor  dentition)  Medical Food Supplement: Commercial beverage  Goal: Glucerna TID with meals (220 Kcal and 10 gm protein per 8 fl oz serving)  Additional Interventions: Consider initiation of insulin coverage for BS goal  mg/dL    Collaboration and Referral of Nutrition Care: Collaboration by nutrition professional with other providers  Goal: IDT meeting    Nutrition Education:   N/A      Nutrition Monitoring and Evaluation   Food/Nutrient Related History Monitoring  Monitoring and Evaluation Plan: Energy intake, Amount of food  Energy Intake: Estimated energy intake  Criteria: Pt will consume >50% of meals  Amount of Food: Medical food intake  Criteria: Pt will consume >75% of glucerna TID    Body Composition/Growth/Weight History  Monitoring and Evaluation Plan: Weight  Weight: Measured weight  Criteria: maintain weight    Biochemical Data, Medical Tests and Procedures  Monitoring and Evaluation Plan: Electrolyte/renal panel, Glucose/endocrine profile  Electrolyte and Renal Panel: Sodium  Criteria: WNL  Glucose/Endocrine Profile: Glucose, casual  Criteria: goal  mg/dL    Nutrition Focused Physical Findings  Other: Evaluate nutrition intervention as compared to nutrition goal(s) and estimated nutrient need criteria.     Follow Up  Time Spent (min): 60 minutes  Last Date of Nutrition Visit: 08/09/24  Nutrition Follow-Up Needed?: Dietitian to reassess per policy  Follow up Comment: glucerna TID

## 2024-08-09 NOTE — CARE PLAN
Patient admitted to floor comfort care for syncopal episode at home with family. Stable on RA, incontinent of soft, rust-colored stool. Pt is forgetful, repetitive and alert only to self and at times place.

## 2024-08-09 NOTE — ED PROVIDER NOTES
HPI   Chief Complaint   Patient presents with    Syncope       Patient has dementia and lives alone but home health checks on her as does her family.  She has been getting weaker over the last few days and today passed out while on the toilet and also while she was playing solitaire at the table.  Her daughter says that she was short of breath and has a very moist cough that they can hear rattling in her chest.  Patient herself denies anything being wrong.  She does not use oxygen at home.  She has no history of congestive heart failure but does have a pacemaker and 2 stents.  Her doctor is Dr. Elmore for her heart issues.            Patient History   History reviewed. No pertinent past medical history.  Past Surgical History:   Procedure Laterality Date    BREAST LUMPECTOMY  01/14/2016    Left Breast Lumpectomy    GALLBLADDER SURGERY  01/14/2016    Gallbladder Surgery    HYSTERECTOMY  01/14/2016    Hysterectomy    TOTAL KNEE ARTHROPLASTY  01/14/2016    Knee Replacement     No family history on file.  Social History     Tobacco Use    Smoking status: Never    Smokeless tobacco: Never   Substance Use Topics    Alcohol use: Not on file    Drug use: Not on file       Physical Exam   ED Triage Vitals [08/08/24 2056]   Temperature Heart Rate Respirations BP   36.3 °C (97.4 °F) 60 18 (!) 85/49      SpO2 Temp src Heart Rate Source Patient Position   95 % -- -- --      BP Location FiO2 (%)     -- --       Physical Exam  Vitals and nursing note reviewed.   Constitutional:       Appearance: She is ill-appearing.   HENT:      Head: Normocephalic and atraumatic.      Right Ear: Tympanic membrane and ear canal normal.      Left Ear: Tympanic membrane and ear canal normal.      Nose: Nose normal.      Mouth/Throat:      Mouth: Mucous membranes are moist.   Cardiovascular:      Rate and Rhythm: Normal rate.   Pulmonary:      Effort: Pulmonary effort is normal.      Breath sounds: Rhonchi present.   Abdominal:      General:  "Abdomen is flat.      Palpations: Abdomen is soft.   Musculoskeletal:         General: Normal range of motion.      Cervical back: Normal range of motion.   Skin:     General: Skin is warm and dry.   Neurological:      General: No focal deficit present.      Mental Status: She is alert.      Motor: Weakness present.   Psychiatric:         Thought Content: Thought content normal.         Judgment: Judgment normal.           ED Course & MDM   Diagnoses as of 08/09/24 0303   Severe pneumonia   Lower GI bleeding   Colitis   Pneumonia due to organism                 No data recorded     Johnathan Coma Scale Score: 14 (08/08/24 2111 : Tessa Barksdale RN)                           Medical Decision Making  EKG interpreted by me: Atrial paced rhythm with a rate of 60.  No ST elevation tonight.    I suspect that there is an underlying infection so we are checking x-rays of chest as well as CTs of chest abdomen pelvis given her history of cough and trouble passing stool.  Will need to obtain a urine specimen as well and then we should be able to get her admitted.    CTs of chest abdomen pelvis showed the following: Bibasilar pneumonia; colitis; constipation.  We are making arrangements to have the patient admitted here to treat her pneumonia and constipation when she began passing obvious melena but more earle colored consistent with a lower GI bleed.  This would fit with the finding of colitis on her CT.  I called the daughter with whom I had spoken here in the ED earlier when her mom first came in.  I explained what was happening and discussed CODE STATUS.  Daughter says for now she wants only comfort care with no heroic measures, including any endoscopy.  She says she prefers to \"take it 1 day at a time.\"        Procedure  Procedures     Ralf Sousa MD  08/08/24 2121       Ralf Sousa MD  08/09/24 0050       Ralf Sousa MD  08/09/24 0303    "

## 2024-08-09 NOTE — PROGRESS NOTES
Physical Therapy    Physical Therapy Evaluation    Patient Name: Sabina Cunha  MRN: [id number]  Today's Date: 8/9/2024   Time Calculation  Start Time: 1530  Stop Time: 1558  Time Calculation (min): 28 min    Assessment/Plan   PT Assessment  Rehab Prognosis: Poor  Evaluation/Treatment Tolerance: Patient tolerated treatment well  Barriers to Participation: Ability to acquire knowledge, Comorbidities, Insight into problems, Premorbid level of function  End of Session Communication: Bedside nurse  Assessment Comment: Patient appears to be near mobility baseline. Use of walker recommended for safety. 24 hour supervision is essential 2/2 severe dementia/memory deficits.  End of Session Patient Position: Bed, 2 rail up, Alarm on  IP OR SWING BED PT PLAN  Inpatient or Swing Bed: Inpatient  PT Plan  PT Plan: PT Eval only  PT Eval Only Reason: No acute PT needs identified  PT Discharge Recommendations: 24 hr supervision due to cognition  PT Recommended Transfer Status: Stand by assist  PT - OK to Discharge: Yes  Based on completed evaluation and care plan recommendations, no barriers to discharge to next site of care      Subjective   General Visit Information:  General  Reason for Referral: Assess mobility s/p admission with colitis and pneumonia  Referred By: Dr. Garcia  Past Medical History Relevant to Rehab: Alzheimer's dementia, HTN, pacemaker, seizures, anemia, CAD s/p stent, HLD, OA, MARYANN (HPI: weakness and blood in stool.)  Prior to Session Communication: Bedside nurse  Patient Position Received: Bed, 2 rail up, Alarm on  General Comment: One on one 2/2 dementia and interference with medical equipment.    Home Living:  Home Living  Type of Home: House  Lives With: Alone (Family looks in on her as needed.)  Home Access: Stairs to enter with rails  Bathroom Accessibility: No issues noted (Note: patient poor historian.)    Prior Level of Function:  Prior Function Per Pt/Caregiver Report  Receives Help From:  Family    Precautions:  Precautions  Medical Precautions: Fall precautions    Vital Signs:  Stable     Objective   Pain:  Pain Assessment  0-10 (Numeric) Pain Score: 0 - No pain    Cognition:  Cognition  Overall Cognitive Status: Impaired, Impaired at baseline    General Assessments:   Activity Tolerance  Endurance: Endurance does not limit participation in activity    Sensation  Light Touch: No apparent deficits    Strength  Strength Comments: WFL observed during mobility eval  Coordination  Movements are Fluid and Coordinated: Yes    Static Sitting Balance  Static Sitting-Level of Assistance: Modified independent    Static Standing Balance  Static Standing-Level of Assistance: Close supervision  Dynamic Standing Balance  Dynamic Standing-Comments: close supervision with rolling walker (Primarily 2/2 dementia)    Functional Assessments:  Bed Mobility  Bed Mobility: Yes  Bed Mobility 1  Bed Mobility 1: Supine to sitting, Sitting to supine  Level of Assistance 1: Modified independent    Transfers  Transfer: Yes  Transfer 1  Technique 1: Sit to stand, Stand to sit  Transfer Level of Assistance 1: Modified independent    Ambulation/Gait Training  Ambulation/Gait Training Performed: Yes  Ambulation/Gait Training 1  Device 1: Rolling walker  Assistance 1: Close supervision  Comments/Distance (ft) 1: 60 ft. (Requires supervision 2/2 disorientation)     Outcome Measures:  [facility] Basic Mobility  Turning from your back to your side while in a flat bed without using bedrails: None  Moving from lying on your back to sitting on the side of a flat bed without using bedrails: None  Moving to and from bed to chair (including a wheelchair): A little  Standing up from a chair using your arms (e.g. wheelchair or bedside chair): A little  To walk in hospital room: A little  Climbing 3-5 steps with railing: A little  Basic Mobility - Total Score: 20    Encounter Problems       Encounter Problems (Active)       Pain - Adult               Education Documentation  No documentation found.  Education Comments  No comments found.

## 2024-08-09 NOTE — H&P
History of Present Illness  Sabina Cunha is a 89 y.o. female  with PMHx significant for Alzheimer's dementia, HTN, pacemaker, seizures, anemia, CAD s/p stent, HLD, OA, MARYANN, who presented to [facility] due to progressive weakness over the past several days and subsequently passing out on the toilet and while playing solitaire. Family had reported moist cough with rattling in her chest. The patient admits to cough but no chest pain and no shortness of breath. She believes that she is having regular bowel movements but admits that her short term memory is very poor and she is unable to remember from day to day.   ED work up significant for JET and was given fluid bolus. Stool is positive for occult blood without overt bleeding and HGB is stable. CT chest and abdomen with bilateral lowe lobe PNA and inflammation and edema of the left colon and sigmoid colon without stool burden. She was initiated on azithromycin, solumedrol, and rocephin in the ED.   AM lactate this am 3.1>4.2 and a mild worsening of JET. Flagyl initiated and another fluid bolus ordered.     12 Point ROS negative unless noted in above HPI     Past Medical History  History reviewed. No pertinent past medical history.    Surgical History  Past Surgical History:   Procedure Laterality Date    BREAST LUMPECTOMY  01/14/2016    Left Breast Lumpectomy    GALLBLADDER SURGERY  01/14/2016    Gallbladder Surgery    HYSTERECTOMY  01/14/2016    Hysterectomy    TOTAL KNEE ARTHROPLASTY  01/14/2016    Knee Replacement        Social History  She reports that she has never smoked. She has never used smokeless tobacco. No history on file for alcohol use and drug use.    Allergies  Eggshell membrane, Oxycodone-acetaminophen, and Wheat     Physical Exam  Constitutional:       Appearance: She is obese. She is not ill-appearing.   HENT:      Head: Atraumatic.      Nose: Nose normal.      Mouth/Throat:      Mouth: Mucous membranes are moist.   Eyes:      Extraocular  "Movements: Extraocular movements intact.      Pupils: Pupils are equal, round, and reactive to light.   Cardiovascular:      Rate and Rhythm: Normal rate and regular rhythm.      Pulses: Normal pulses.   Pulmonary:      Effort: Pulmonary effort is normal.      Breath sounds: Rales (BLL) present.   Abdominal:      General: Bowel sounds are normal.      Palpations: Abdomen is soft.      Tenderness: There is abdominal tenderness (left sided abd pain with palpation).   Musculoskeletal:      Right lower leg: No edema.      Left lower leg: No edema.   Skin:     General: Skin is warm and dry.      Capillary Refill: Capillary refill takes less than 2 seconds.   Neurological:      Mental Status: She is alert.      Motor: Weakness present.      Comments: Very poor memory but is appropriate to situation.    Psychiatric:         Mood and Affect: Mood normal.         Behavior: Behavior normal.          Last Recorded Vitals  Blood pressure 105/63, pulse 60, temperature 36.1 °C (96.9 °F), temperature source Temporal, resp. rate 20, height 1.651 m (5' 5\"), weight 83 kg (182 lb 15.7 oz), SpO2 96%.    Relevant Results  Scheduled medications  [START ON 8/10/2024] azithromycin, 500 mg, oral, q24h MARIA  cefTRIAXone, 2 g, intravenous, q24h  ipratropium-albuteroL, 3 mL, nebulization, TID  lactated Ringer's, 500 mL, intravenous, Once  methylPREDNISolone sodium succinate (PF), 40 mg, intravenous, q8h  metroNIDAZOLE, 500 mg, intravenous, q8h  pantoprazole, 40 mg, intravenous, Daily      Continuous medications  lactated Ringer's, 75 mL/hr, Last Rate: 75 mL/hr (08/09/24 1034)      PRN medications  PRN medications: ipratropium-albuteroL     Results for orders placed or performed during the hospital encounter of 08/08/24 (from the past 24 hour(s))   Light Blue Top   Result Value Ref Range    Extra Tube Hold for add-ons.    PST Top   Result Value Ref Range    Extra Tube Hold for add-ons.    Lavender Top   Result Value Ref Range    Extra Tube Hold " for add-ons.    Gray Top   Result Value Ref Range    Extra Tube Hold for add-ons.    PST Top   Result Value Ref Range    Extra Tube Hold for add-ons.    POCT GLUCOSE   Result Value Ref Range    POCT Glucose 169 (H) 74 - 99 mg/dL   CBC with Differential   Result Value Ref Range    WBC 8.0 4.4 - 11.3 x10*3/uL    nRBC 0.0 0.0 - 0.0 /100 WBCs    RBC 4.57 4.00 - 5.20 x10*6/uL    Hemoglobin 12.9 12.0 - 16.0 g/dL    Hematocrit 40.3 36.0 - 46.0 %    MCV 88 80 - 100 fL    MCH 28.2 26.0 - 34.0 pg    MCHC 32.0 32.0 - 36.0 g/dL    RDW 13.8 11.5 - 14.5 %    Platelets 329 150 - 450 x10*3/uL    Neutrophils % 46.0 40.0 - 80.0 %    Immature Granulocytes %, Automated 0.3 0.0 - 0.9 %    Lymphocytes % 47.1 13.0 - 44.0 %    Monocytes % 5.0 2.0 - 10.0 %    Eosinophils % 1.1 0.0 - 6.0 %    Basophils % 0.5 0.0 - 2.0 %    Neutrophils Absolute 3.68 1.60 - 5.50 x10*3/uL    Immature Granulocytes Absolute, Automated 0.02 0.00 - 0.50 x10*3/uL    Lymphocytes Absolute 3.77 (H) 0.80 - 3.00 x10*3/uL    Monocytes Absolute 0.40 0.05 - 0.80 x10*3/uL    Eosinophils Absolute 0.09 0.00 - 0.40 x10*3/uL    Basophils Absolute 0.04 0.00 - 0.10 x10*3/uL   Comprehensive Metabolic Panel   Result Value Ref Range    Glucose 211 (H) 74 - 99 mg/dL    Sodium 134 (L) 136 - 145 mmol/L    Potassium 3.8 3.5 - 5.3 mmol/L    Chloride 99 98 - 107 mmol/L    Bicarbonate 25 21 - 32 mmol/L    Anion Gap 14 10 - 20 mmol/L    Urea Nitrogen 27 (H) 6 - 23 mg/dL    Creatinine 1.19 (H) 0.50 - 1.05 mg/dL    eGFR 42 (L) >60 mL/min/1.73m*2    Calcium 9.8 8.6 - 10.3 mg/dL    Albumin 3.8 3.4 - 5.0 g/dL    Alkaline Phosphatase 48 33 - 136 U/L    Total Protein 7.2 6.4 - 8.2 g/dL    AST 18 9 - 39 U/L    Bilirubin, Total 0.6 0.0 - 1.2 mg/dL    ALT 10 7 - 45 U/L   Troponin I, High Sensitivity   Result Value Ref Range    Troponin I, High Sensitivity 7 0 - 13 ng/L   B-type natriuretic peptide   Result Value Ref Range    BNP 27 0 - 99 pg/mL   BLOOD GAS VENOUS FULL PANEL   Result Value Ref Range     POCT pH, Venous 7.34 7.33 - 7.43 pH    POCT pCO2, Venous 56 (H) 41 - 51 mm Hg    POCT pO2, Venous 31 (L) 35 - 45 mm Hg    POCT SO2, Venous 45 45 - 75 %    POCT Oxy Hemoglobin, Venous 43.8 (L) 45.0 - 75.0 %    POCT Hematocrit Calculated, Venous 38.0 36.0 - 46.0 %    POCT Sodium, Venous 136 136 - 145 mmol/L    POCT Potassium, Venous 4.3 3.5 - 5.3 mmol/L    POCT Chloride, Venous 103 98 - 107 mmol/L    POCT Ionized Calicum, Venous 1.23 1.10 - 1.33 mmol/L    POCT Glucose, Venous 193 (H) 74 - 99 mg/dL    POCT Lactate, Venous 2.4 (H) 0.4 - 2.0 mmol/L    POCT Base Excess, Venous 3.2 (H) -2.0 - 3.0 mmol/L    POCT HCO3 Calculated, Venous 30.2 (H) 22.0 - 26.0 mmol/L    POCT Hemoglobin, Venous 12.8 12.0 - 16.0 g/dL    POCT Anion Gap, Venous 7.0 (L) 10.0 - 25.0 mmol/L    Patient Temperature      FiO2 0 %   Occult Blood, Stool    Specimen: Stool   Result Value Ref Range    Occult Blood, Stool X1 Positive (A) Negative   B-Type Natriuretic Peptide   Result Value Ref Range    BNP 68 0 - 99 pg/mL   CBC   Result Value Ref Range    WBC 13.0 (H) 4.4 - 11.3 x10*3/uL    nRBC 0.0 0.0 - 0.0 /100 WBCs    RBC 4.39 4.00 - 5.20 x10*6/uL    Hemoglobin 12.3 12.0 - 16.0 g/dL    Hematocrit 39.5 36.0 - 46.0 %    MCV 90 80 - 100 fL    MCH 28.0 26.0 - 34.0 pg    MCHC 31.1 (L) 32.0 - 36.0 g/dL    RDW 13.9 11.5 - 14.5 %    Platelets 285 150 - 450 x10*3/uL   Basic metabolic panel   Result Value Ref Range    Glucose 321 (H) 74 - 99 mg/dL    Sodium 135 (L) 136 - 145 mmol/L    Potassium 3.9 3.5 - 5.3 mmol/L    Chloride 101 98 - 107 mmol/L    Bicarbonate 25 21 - 32 mmol/L    Anion Gap 13 10 - 20 mmol/L    Urea Nitrogen 33 (H) 6 - 23 mg/dL    Creatinine 1.30 (H) 0.50 - 1.05 mg/dL    eGFR 38 (L) >60 mL/min/1.73m*2    Calcium 8.2 (L) 8.6 - 10.3 mg/dL   Magnesium   Result Value Ref Range    Magnesium 1.62 1.60 - 2.40 mg/dL   Lactate   Result Value Ref Range    Lactate 3.1 (H) 0.4 - 2.0 mmol/L   Lactate   Result Value Ref Range    Lactate 4.2 (HH) 0.4 - 2.0  mmol/L        Imaging  CT chest abdomen pelvis w IV contrast    Result Date: 8/9/2024  STUDY: CT Chest, Abdomen, and Pelvis with IV Contrast; 8/9/2024 12:17 AM. INDICATION: Shortness of breath.  Constipation. COMPARISON: CXR 8/8/2024. ACCESSION NUMBER(S): [id number] ORDERING CLINICIAN: GABRIELA LIN TECHNIQUE: CT of the chest, abdomen, and pelvis was performed.  Contiguous axial images were obtained at 3 mm slice thickness through the chest, abdomen, and pelvis.  Coronal and sagittal reconstructions at 3 mm slice thickness were performed.  Omnipaque 350 75 mL was administered intravenously.  FINDINGS: CHEST: Mild cardiac enlargement.  No pericardial effusion.  Coronary artery calcifications.  No aortic aneurysm or dissection.  No significant mediastinal or hilar adenopathy.  No findings of pulmonary embolus. Bilateral lower lobe airspace disease.  No pleural effusion.  No pneumothorax. Abdomen: No acute abnormality of the stomach is identified. The liver of normal size and contour.  Question 1.4 cm hemangioma RIGHT hepatic lobe.  No intrahepatic ductal dilatation is identified. Gallbladder surgically absent.  No acute abnormality of the pancreas.  Duodenal diverticulum. Spleen of normal appearance.  Fullness of the LEFT adrenal gland.  No discrete mass.  RIGHT adrenal gland of normal appearance. No acute renal process. No retroperitoneal adenopathy.  Atherosclerosis of the abdominal aorta and its branches.  Approximate 2 cm focal RIGHT lateral aneurysm infrarenal abdominal aorta.  No findings of leak or rupture. Thickening and inflammatory changes of the LEFT colon and sigmoid colon.  There are diverticulum in this area without extraluminal gas or abscess.  No portal venous gas identified.  No bowel obstruction. No free air.  No free fluid. Appendix is not identified.  No acute abnormality within its expected location. Pelvis: There is a small amount of free fluid within the dependent pelvis.  No pelvic  abscess.  No pelvic adenopathy.  Mild to moderate rectosigmoid stool burden. Skeleton: Spondylotic changes and facet arthrosis.  No acute bony abnormality identified.  No acute bony process is identified.    Bilateral lower lobe pneumonia. Inflammation and edema of the LEFT colon and sigmoid colon.  No bowel obstruction.  No bowel wall pneumatosis or extraluminal gas.  No abscess.  There are diverticulum of the sigmoid colon.  The more diffuse nature of this appearance suggests colitis.  Unlikely representing diverticulitis although concurrent diverticulitis difficult to exclude. 2 cm focal RIGHT lateral infrarenal aortic aneurysm.  No findings of leak or rupture.  No priors are available for comparison. 1.4 cm RIGHT hepatic hemangioma.  Mild fullness of the LEFT adrenal gland.  No discrete adrenal mass. Signed by Jonathan Carney MD    XR chest 1 view    Result Date: 8/9/2024  STUDY: Chest Radiograph;  8/8/2024 10:54PM INDICATION: Shortness of breath. COMPARISON: [ SELECT TYPE OF COMPARISON ] ACCESSION NUMBER(S): [id number] ORDERING CLINICIAN: GABRIELA LIN TECHNIQUE:  Frontal chest was obtained at 22:53 hours. FINDINGS: Pacemaker on the right. CARDIOMEDIASTINAL SILHOUETTE: Cardiomediastinal silhouette is normal in size and configuration.  LUNGS: Lungs are clear.  ABDOMEN: No remarkable upper abdominal findings.  BONES: No acute osseous changes.    No acute pulmonary abnormality. Signed by Landon Alexis MD      Assessment/Plan    #PNA  ~CT chest and abdomen with bilateral lower lobe PNA  ~stable on room air  ~rocephin, zithromax, duoneb, solumedrol initiated    #Colitis  ~left sided abdominal pain with palpation  ~CT ABD inflammation and edema of the left colon and sigmoid colon without stool burden  ~fluid bolus in ED  ~LR 75  ~flagyl 500 IV Q8H    #Lactic acidosis  ~2/2 PNA and colitis  ~LA 3.1>4.2  ~fluid bolus   ~IVF LR 75/hr    #JET  ~2/2 PNA and colitis  ~IVF bolus X 2 LR 75  ~monitor renal  function    #Generalized weakness  ~ progressive weakness over the past several days 2/2 PNA and colitis  ~PT/OT evaluations ordered    #Hematochezia  ~staff report rust colored stool  ~OCB +  ~no overt symptoms of bleeding  ~HGB 12.3  ~monitor H/H  ~protonix for GI protection    #Cardiac  ~HTN, pacemaker, CAD s/p stent, HLD  #Alzheimer's dementia  #Seizures  #Osteoarthritis  #Obstructive sleep apnea    Disposition: Admitted 2/2 PNA and colitis. ES LOS > 2 midnights.     I spent 55 minutes in the professional and overall care of this patient.      PATRICIA Izquierdo-CNP  Internal Medicine  Attending Attestation:    I was present with the PATRICIA-CNP who participated in the documentation of this note. I have personally seen and re-examined the patient and performed the medical decision-making components (assessment and plan of care). I have reviewed the documentation and verified the findings in the note as written with additions or exceptions as stated in the body of this note.    Dr. Otilio Garcia MD  Internal Medicine

## 2024-08-10 LAB
ANION GAP SERPL CALC-SCNC: 12 MMOL/L (ref 10–20)
BUN SERPL-MCNC: 35 MG/DL (ref 6–23)
CALCIUM SERPL-MCNC: 8 MG/DL (ref 8.6–10.3)
CHLORIDE SERPL-SCNC: 106 MMOL/L (ref 98–107)
CO2 SERPL-SCNC: 26 MMOL/L (ref 21–32)
CREAT SERPL-MCNC: 1.1 MG/DL (ref 0.5–1.05)
EGFRCR SERPLBLD CKD-EPI 2021: 47 ML/MIN/1.73M*2
ERYTHROCYTE [DISTWIDTH] IN BLOOD BY AUTOMATED COUNT: 14.6 % (ref 11.5–14.5)
GLUCOSE SERPL-MCNC: 214 MG/DL (ref 74–99)
HCT VFR BLD AUTO: 30.6 % (ref 36–46)
HGB BLD-MCNC: 9.8 G/DL (ref 12–16)
MCH RBC QN AUTO: 28.2 PG (ref 26–34)
MCHC RBC AUTO-ENTMCNC: 32 G/DL (ref 32–36)
MCV RBC AUTO: 88 FL (ref 80–100)
NRBC BLD-RTO: 0 /100 WBCS (ref 0–0)
PLATELET # BLD AUTO: 227 X10*3/UL (ref 150–450)
POTASSIUM SERPL-SCNC: 3.7 MMOL/L (ref 3.5–5.3)
RBC # BLD AUTO: 3.47 X10*6/UL (ref 4–5.2)
SODIUM SERPL-SCNC: 140 MMOL/L (ref 136–145)
WBC # BLD AUTO: 15.8 X10*3/UL (ref 4.4–11.3)

## 2024-08-10 PROCEDURE — 99232 SBSQ HOSP IP/OBS MODERATE 35: CPT | Performed by: NURSE PRACTITIONER

## 2024-08-10 PROCEDURE — 2500000004 HC RX 250 GENERAL PHARMACY W/ HCPCS (ALT 636 FOR OP/ED): Mod: IPSPLIT

## 2024-08-10 PROCEDURE — 2500000004 HC RX 250 GENERAL PHARMACY W/ HCPCS (ALT 636 FOR OP/ED): Mod: IPSPLIT | Performed by: NURSE PRACTITIONER

## 2024-08-10 PROCEDURE — 2500000002 HC RX 250 W HCPCS SELF ADMINISTERED DRUGS (ALT 637 FOR MEDICARE OP, ALT 636 FOR OP/ED): Mod: IPSPLIT | Performed by: NURSE PRACTITIONER

## 2024-08-10 PROCEDURE — 94760 N-INVAS EAR/PLS OXIMETRY 1: CPT | Mod: IPSPLIT

## 2024-08-10 PROCEDURE — 82565 ASSAY OF CREATININE: CPT | Mod: IPSPLIT

## 2024-08-10 PROCEDURE — 82310 ASSAY OF CALCIUM: CPT | Mod: IPSPLIT

## 2024-08-10 PROCEDURE — 97165 OT EVAL LOW COMPLEX 30 MIN: CPT | Mod: GO,IPSPLIT | Performed by: OCCUPATIONAL THERAPIST

## 2024-08-10 PROCEDURE — 80048 BASIC METABOLIC PNL TOTAL CA: CPT | Mod: IPSPLIT

## 2024-08-10 PROCEDURE — 2500000001 HC RX 250 WO HCPCS SELF ADMINISTERED DRUGS (ALT 637 FOR MEDICARE OP): Mod: IPSPLIT | Performed by: NURSE PRACTITIONER

## 2024-08-10 PROCEDURE — C9113 INJ PANTOPRAZOLE SODIUM, VIA: HCPCS | Mod: IPSPLIT

## 2024-08-10 PROCEDURE — 85027 COMPLETE CBC AUTOMATED: CPT | Mod: IPSPLIT

## 2024-08-10 PROCEDURE — 36415 COLL VENOUS BLD VENIPUNCTURE: CPT | Mod: IPSPLIT

## 2024-08-10 PROCEDURE — 1100000001 HC PRIVATE ROOM DAILY: Mod: IPSPLIT

## 2024-08-10 RX ADMIN — AZITHROMYCIN DIHYDRATE 500 MG: 250 TABLET ORAL at 08:52

## 2024-08-10 RX ADMIN — METRONIDAZOLE 500 MG: 5 INJECTION, SOLUTION INTRAVENOUS at 07:54

## 2024-08-10 RX ADMIN — METHYLPREDNISOLONE SODIUM SUCCINATE 40 MG: 40 INJECTION, POWDER, FOR SOLUTION INTRAMUSCULAR; INTRAVENOUS at 18:06

## 2024-08-10 RX ADMIN — METHYLPREDNISOLONE SODIUM SUCCINATE 40 MG: 40 INJECTION, POWDER, FOR SOLUTION INTRAMUSCULAR; INTRAVENOUS at 01:19

## 2024-08-10 RX ADMIN — METRONIDAZOLE 500 MG: 5 INJECTION, SOLUTION INTRAVENOUS at 23:10

## 2024-08-10 RX ADMIN — APIXABAN 2.5 MG: 2.5 TABLET, FILM COATED ORAL at 20:06

## 2024-08-10 RX ADMIN — PANTOPRAZOLE SODIUM 40 MG: 40 INJECTION, POWDER, FOR SOLUTION INTRAVENOUS at 08:52

## 2024-08-10 RX ADMIN — SODIUM CHLORIDE, POTASSIUM CHLORIDE, SODIUM LACTATE AND CALCIUM CHLORIDE 75 ML/HR: 600; 310; 30; 20 INJECTION, SOLUTION INTRAVENOUS at 16:57

## 2024-08-10 RX ADMIN — METRONIDAZOLE 500 MG: 5 INJECTION, SOLUTION INTRAVENOUS at 16:57

## 2024-08-10 RX ADMIN — CEFTRIAXONE 2 G: 2 INJECTION, SOLUTION INTRAVENOUS at 13:36

## 2024-08-10 RX ADMIN — METHYLPREDNISOLONE SODIUM SUCCINATE 40 MG: 40 INJECTION, POWDER, FOR SOLUTION INTRAMUSCULAR; INTRAVENOUS at 10:24

## 2024-08-10 ASSESSMENT — PAIN SCALES - GENERAL
PAINLEVEL_OUTOF10: 0 - NO PAIN

## 2024-08-10 ASSESSMENT — COGNITIVE AND FUNCTIONAL STATUS - GENERAL
HELP NEEDED FOR BATHING: A LITTLE
TOILETING: A LITTLE
DAILY ACTIVITIY SCORE: 18
WALKING IN HOSPITAL ROOM: A LITTLE
DRESSING REGULAR UPPER BODY CLOTHING: A LITTLE
DAILY ACTIVITIY SCORE: 18
DRESSING REGULAR LOWER BODY CLOTHING: A LITTLE
PERSONAL GROOMING: A LITTLE
DRESSING REGULAR UPPER BODY CLOTHING: A LITTLE
EATING MEALS: A LITTLE
EATING MEALS: A LITTLE
MOBILITY SCORE: 20
MOVING TO AND FROM BED TO CHAIR: A LITTLE
STANDING UP FROM CHAIR USING ARMS: A LITTLE
CLIMB 3 TO 5 STEPS WITH RAILING: A LITTLE
DRESSING REGULAR LOWER BODY CLOTHING: A LITTLE
PERSONAL GROOMING: A LITTLE
HELP NEEDED FOR BATHING: A LITTLE
TOILETING: A LITTLE

## 2024-08-10 ASSESSMENT — PAIN - FUNCTIONAL ASSESSMENT
PAIN_FUNCTIONAL_ASSESSMENT: 0-10
PAIN_FUNCTIONAL_ASSESSMENT: 0-10

## 2024-08-10 NOTE — CARE PLAN
The patient's goals for the shift include      The clinical goals for the shift include Patient will use call bell before exiting bed through 1900    Over the shift, the patient did use call bell throughout the shift and daughter was at bedside most of the shift. Patient was up to chair till 3pm and requested to go to bed for dinner. Patient tolerated IVF and ATB. Patient denies pain at this time. Call light in reach.

## 2024-08-10 NOTE — PROGRESS NOTES
Occupational Therapy    Evaluation    Patient Name: Sabina Cunha  MRN: [id number]  Today's Date: 8/10/2024  Time Calculation  Start Time: 0940  Stop Time: 1012  Time Calculation (min): 32 min    Assessment  IP OT Assessment  OT Assessment: Pt is 89 y/o female with history of dementia who presents with PN, JET and colitis. Pt physical mobility and transfers, ADL's Supervision and cueing level only d/t cognition. Pt has severe neurocognitive impairment. Refer to MOCA results. Recommend 24 hour supervision  Prognosis: Fair  Barriers to Discharge: Other (Comment) (neurocognitive impairment)  Evaluation/Treatment Tolerance: Patient tolerated treatment well  Medical Staff Made Aware: Yes  End of Session Communication: Bedside nurse, Physician  End of Session Patient Position: Alarm on, Up in chair  Plan:  No Skilled OT: At baseline function  OT Discharge Recommendations: 24 hr supervision due to cognition  OT Recommended Transfer Status: Stand by assist  OT - OK to Discharge: Yes Based on completed evaluation and care plan recommendations, no barriers to discharge to next site of care       Subjective   Current Problem:  1. Severe pneumonia        2. Lower GI bleeding        3. Colitis        4. Pneumonia due to organism          General:  General  Reason for Referral: PN, JET, colitis. Assess ADL's and safety/cognition  Referred By: Kathy Rizo, APRN-CNP  Past Medical History Relevant to Rehab: AT-dementia, HTN, pacemaker, seizures, anemia, CAD s/p stent, HLD, OA, MARYANN, L breast lumpectomy, gallbladder surgery, B TKA  Family/Caregiver Present: No  Prior to Session Communication: Bedside nurse, Physician  Patient Position Received: Bed, 2 rail up, Alarm on  General Comment: Increased confusion  Precautions:  Medical Precautions: Fall precautions, Cardiac precautions, Seizure precautions  Vital Signs:     Pain:  Pain Assessment  Pain Assessment: 0-10  0-10 (Numeric) Pain Score: 0 - No pain    Objective    Cognition:  Overall Cognitive Status: Impaired at baseline  Orientation Level: Disoriented to place, Disoriented to time, Disoriented to situation  Following Commands: Follows one step commands with increased time  Safety Judgment: Decreased awareness of need for assistance  Problem Solving: Assistance required to generate solutions  Memory: Exceptions to WFL  Short-Term Memory: Impaired  Working Memory: Impaired  Insight: Severe  Cognition Test Scores  Cognition Tests: Cognition Test Performed: MOCA  Octavio Cognitive Assessment; MoCA version 8.1 completed this date in patient's room.  Room door closed to minimize environmental distractions. Pt presented as fatigued. Diminished attention to task, delayed response and delayed processing of instruction, poor insight re: cognitive impairments.    MOCA Overall Score: 4/30       VS/EXE: 1/5  NAM: 1/3  ATTN: 0/6  CYNTHIA: 0/3  ABS: 0/2  DEL REC:  0/5   MIS: 0/15  OR: 2/6    Pt's overall score is categorized as Severe Cognitive Impairment (less than 10). Based on pt's overall score and performance, OT recommendations include: 24 hr Supervision, consistent provision of medications with supervision for medication compliance; consideration for guardianship for assistance with medical and financial decisions/management. Pt would benefit from transition to a secured memory care assisted living environment upon D/C      Home Living:  Type of Home: House  Lives With: Alone (Family and HH do health checks on her)  Home Access: Stairs to enter with rails  Home Living Comments: Pt poor historian   Prior Function:  Receives Help From: Family (patient states daughter)  Prior Function Comments: Pt poor historian     ADL:  Eating Deficit: Setup  Grooming Deficit: Setup  UE Dressing Deficit: Setup  LE Dressing Assistance: Stand by  LE Dressing Deficit: Setup  Toileting Assistance with Device: Stand by  Activity Tolerance:  Endurance: Endurance does not limit participation in  activity  Bed Mobility/Transfers: Bed Mobility  Bed Mobility: Yes  Bed Mobility 1  Bed Mobility 1: Supine to sitting  Level of Assistance 1: Modified independent    Transfers  Transfer: Yes  Transfer 1  Transfer From 1: Bed to  Transfer to 1: Toilet  Technique 1: Sit to stand, Stand to sit  Transfer Level of Assistance 1: Close supervision  Trials/Comments 1: cues to direct patient  Transfers 2  Transfer From 2: Toilet to  Transfer to 2: Chair with arms  Transfer Device 2: Walker  Transfer Level of Assistance 2: Close supervision, Minimal verbal cues    Functional Mobility:  Functional Mobility  Functional Mobility Performed: Yes  Functional Mobility 1  Surface 1: Level tile  Device 1: Rolling walker  Assistance 1: Close supervision  Sitting Balance:  Static Sitting Balance  Static Sitting-Level of Assistance:  (Good)  Dynamic Sitting Balance  Dynamic Sitting-Balance:  (Good)  Standing Balance:  Static Standing Balance  Static Standing-Level of Assistance:  (Good)  Dynamic Standing Balance  Dynamic Standing-Balance:  (Fair)     Vision: Vision - Basic Assessment  Current Vision: Wears glasses only for reading  Sensation:  Sensation Comment: grossly intact     Coordination:  Movements are Fluid and Coordinated: Yes   Hand Function:  Hand Function  Gross Grasp: Functional  Coordination: Functional  Extremities: RUE   RUE : Within Functional Limits and LUE   LUE: Within Functional Limits    Outcome Measures: [facility] Daily Activity  Putting on and taking off regular lower body clothing: A little  Bathing (including washing, rinsing, drying): A little  Putting on and taking off regular upper body clothing: A little  Toileting, which includes using toilet, bedpan or urinal: A little  Taking care of personal grooming such as brushing teeth: A little  Eating Meals: A little  Daily Activity - Total Score: 18    Education Documentation  Precautions, taught by Michele Casanova OT at 8/10/2024 10:39 AM.  Learner:  Patient  Readiness: Acceptance  Method: Explanation, Demonstration  Response: No Evidence of Learning  Comment: POC, orientation and use of whiteboard, call bell    ADL Training, taught by Michele Casanova OT at 8/10/2024 10:39 AM.  Learner: Patient  Readiness: Acceptance  Method: Explanation, Demonstration  Response: No Evidence of Learning  Comment: POC, orientation and use of whiteboard, call bell

## 2024-08-10 NOTE — PROGRESS NOTES
Sabina Cunha is a 89 y.o. female on day 1 of admission presenting with Pneumonia due to organism.      Subjective   Sabina is seen in her room in no acute distress.   She is seen by therapy today. MOCA noted 4/30.  She would not be safe to live alone.   Continued leukocytosis but likely steroid induced.        Objective     Last Recorded Vitals  /68 (BP Location: Right arm, Patient Position: Lying)   Pulse 67   Temp 36.8 °C (98.2 °F) (Temporal)   Resp 16   Wt 81 kg (178 lb 9.2 oz)   SpO2 95%   Intake/Output last 3 Shifts:    Intake/Output Summary (Last 24 hours) at 8/10/2024 1316  Last data filed at 8/10/2024 1152  Gross per 24 hour   Intake 2557.5 ml   Output --   Net 2557.5 ml       Admission Weight  Weight: 83 kg (182 lb 15.7 oz) (08/08/24 2056)    Daily Weight  08/10/24 : 81 kg (178 lb 9.2 oz)    Image Results  CT chest abdomen pelvis w IV contrast  Narrative: STUDY:  CT Chest, Abdomen, and Pelvis with IV Contrast; 8/9/2024 12:17 AM.  INDICATION:  Shortness of breath.  Constipation.  COMPARISON:  CXR 8/8/2024.  ACCESSION NUMBER(S):  [id number]  ORDERING CLINICIAN:  GABRIELA LIN  TECHNIQUE:  CT of the chest, abdomen, and pelvis was performed.  Contiguous axial  images were obtained at 3 mm slice thickness through the chest,  abdomen, and pelvis.  Coronal and sagittal reconstructions at 3 mm  slice thickness were performed.  Omnipaque 350 75 mL was administered  intravenously.    FINDINGS:  CHEST:  Mild cardiac enlargement.  No pericardial effusion.  Coronary artery  calcifications.  No aortic aneurysm or dissection.  No significant  mediastinal or hilar adenopathy.  No findings of pulmonary embolus.   Bilateral lower lobe airspace disease.  No pleural effusion.  No  pneumothorax.  Abdomen:  No acute abnormality of the stomach is identified.  The liver of normal size and contour.  Question 1.4 cm hemangioma  RIGHT hepatic lobe.  No intrahepatic ductal dilatation is identified.   Gallbladder  surgically absent.    No acute abnormality of the pancreas.  Duodenal diverticulum.  Spleen of normal appearance.    Fullness of the LEFT adrenal gland.  No discrete mass.  RIGHT adrenal  gland of normal appearance.  No acute renal process.   No retroperitoneal adenopathy.  Atherosclerosis of the abdominal aorta  and its branches.  Approximate 2 cm focal RIGHT lateral aneurysm  infrarenal abdominal aorta.  No findings of leak or rupture.  Thickening and inflammatory changes of the LEFT colon and sigmoid  colon.  There are diverticulum in this area without extraluminal gas  or abscess.  No portal venous gas identified.  No bowel obstruction.   No free air.  No free fluid.  Appendix is not identified.  No acute abnormality within its expected  location.  Pelvis:  There is a small amount of free fluid within the dependent pelvis.  No  pelvic abscess.  No pelvic adenopathy.  Mild to moderate rectosigmoid  stool burden.  Skeleton:  Spondylotic changes and facet arthrosis.  No acute bony abnormality  identified.  No acute bony process is identified.  Impression: Bilateral lower lobe pneumonia.  Inflammation and edema of the LEFT colon and sigmoid colon.  No bowel  obstruction.  No bowel wall pneumatosis or extraluminal gas.  No  abscess.  There are diverticulum of the sigmoid colon.  The more  diffuse nature of this appearance suggests colitis.  Unlikely  representing diverticulitis although concurrent diverticulitis  difficult to exclude.  2 cm focal RIGHT lateral infrarenal aortic aneurysm.  No findings of  leak or rupture.  No priors are available for comparison.  1.4 cm RIGHT hepatic hemangioma.  Mild fullness of the LEFT adrenal  gland.  No discrete adrenal mass.  Signed by Jonathan Carney MD  XR chest 1 view  Narrative: STUDY:  Chest Radiograph;  8/8/2024 10:54PM  INDICATION:  Shortness of breath.  COMPARISON:  [ SELECT TYPE OF COMPARISON ]  ACCESSION NUMBER(S):  [id number]  ORDERING CLINICIAN:  GABRIELA CALDERON  RILEY  TECHNIQUE:  Frontal chest was obtained at 22:53 hours.  FINDINGS:  Pacemaker on the right.  CARDIOMEDIASTINAL SILHOUETTE:  Cardiomediastinal silhouette is normal in size and configuration.     LUNGS:  Lungs are clear.     ABDOMEN:  No remarkable upper abdominal findings.     BONES:  No acute osseous changes.  Impression: No acute pulmonary abnormality.  Signed by Landon Alexis MD    Results for orders placed or performed during the hospital encounter of 08/08/24 (from the past 24 hour(s))   CBC   Result Value Ref Range    WBC 15.8 (H) 4.4 - 11.3 x10*3/uL    nRBC 0.0 0.0 - 0.0 /100 WBCs    RBC 3.47 (L) 4.00 - 5.20 x10*6/uL    Hemoglobin 9.8 (L) 12.0 - 16.0 g/dL    Hematocrit 30.6 (L) 36.0 - 46.0 %    MCV 88 80 - 100 fL    MCH 28.2 26.0 - 34.0 pg    MCHC 32.0 32.0 - 36.0 g/dL    RDW 14.6 (H) 11.5 - 14.5 %    Platelets 227 150 - 450 x10*3/uL   Basic metabolic panel   Result Value Ref Range    Glucose 214 (H) 74 - 99 mg/dL    Sodium 140 136 - 145 mmol/L    Potassium 3.7 3.5 - 5.3 mmol/L    Chloride 106 98 - 107 mmol/L    Bicarbonate 26 21 - 32 mmol/L    Anion Gap 12 10 - 20 mmol/L    Urea Nitrogen 35 (H) 6 - 23 mg/dL    Creatinine 1.10 (H) 0.50 - 1.05 mg/dL    eGFR 47 (L) >60 mL/min/1.73m*2    Calcium 8.0 (L) 8.6 - 10.3 mg/dL     Physical Exam  Constitutional:       Appearance: She is obese. She is not ill-appearing.   HENT:      Head: Atraumatic.      Nose: Nose normal.      Mouth/Throat:      Mouth: Mucous membranes are moist.   Eyes:      Extraocular Movements: Extraocular movements intact.      Pupils: Pupils are equal, round, and reactive to light.   Cardiovascular:      Rate and Rhythm: Normal rate and regular rhythm.      Pulses: Normal pulses.   Pulmonary:      Effort: Pulmonary effort is normal.      Breath sounds: Rales (BLL) present.   Abdominal:      General: Bowel sounds are normal.      Palpations: Abdomen is soft.      Tenderness: There is abdominal tenderness (left sided abd pain with  palpation).   Musculoskeletal:      Right lower leg: No edema.      Left lower leg: No edema.   Skin:     General: Skin is warm and dry.      Capillary Refill: Capillary refill takes less than 2 seconds.   Neurological:      Mental Status: She is alert.      Motor: Weakness present.      Comments: Very poor memory but is appropriate to situation.    Psychiatric:         Mood and Affect: Mood normal.         Behavior: Behavior normal.   Scheduled medications  azithromycin, 500 mg, oral, q24h MARIA  cefTRIAXone, 2 g, intravenous, q24h  ipratropium-albuteroL, 3 mL, nebulization, TID  methylPREDNISolone sodium succinate (PF), 40 mg, intravenous, q8h  metroNIDAZOLE, 500 mg, intravenous, q8h  pantoprazole, 40 mg, intravenous, Daily      Continuous medications  lactated Ringer's, 75 mL/hr, Last Rate: 75 mL/hr (08/10/24 1152)      PRN medications  PRN medications: ipratropium-albuteroL    Assessment/Plan        #PNA  ~CT chest and abdomen with bilateral lower lobe PNA  ~stable on room air  ~rocephin, zithromax, duoneb, solumedrol initiated     #Colitis  ~left sided abdominal pain with palpation  ~CT ABD inflammation and edema of the left colon and sigmoid colon without stool burden  ~fluid bolus in ED  ~LR 75  ~flagyl 500 IV Q8H     #Lactic acidosis  ~2/2 PNA and colitis  ~LA 3.1>4.2  ~fluid bolus   ~IVF LR 75/hr     #JET  ~2/2 PNA and colitis  ~IVF bolus X 2 LR 75  ~monitor renal function     #Generalized weakness  ~ progressive weakness over the past several days 2/2 PNA and colitis  ~PT/OT evaluations ordered     #Hematochezia  ~staff report rust colored stool  ~OCB +  ~no overt symptoms of bleeding  ~HGB 12.3  ~monitor H/H  ~protonix for GI protection     #Cardiac  ~HTN, pacemaker, CAD s/p stent, HLD  #Alzheimer's dementia  #Seizures  #Osteoarthritis  #Obstructive sleep apnea     Disposition: Admitted 2/2 PNA and colitis. ES LOS > 2 midnights.      I spent 35 minutes in the professional and overall care of this  patient.    Kathy Rizo, APRN-CNP

## 2024-08-10 NOTE — CARE PLAN
The patient's goals for the shift include  : rest     The clinical goals for the shift include Patient will use call light before exiting bed this shift    Patient up frequently without using call light this shift. Frequent reorientation to place and situation overnight. IVF and IV ATB as ordered. Incontinent at times. Denies pain. VSS on RA. Falls precautions in place.

## 2024-08-11 VITALS
RESPIRATION RATE: 18 BRPM | SYSTOLIC BLOOD PRESSURE: 131 MMHG | DIASTOLIC BLOOD PRESSURE: 75 MMHG | BODY MASS INDEX: 29.94 KG/M2 | WEIGHT: 179.68 LBS | TEMPERATURE: 99.4 F | HEART RATE: 92 BPM | OXYGEN SATURATION: 95 % | HEIGHT: 65 IN

## 2024-08-11 LAB
ANION GAP SERPL CALC-SCNC: 13 MMOL/L (ref 10–20)
BACTERIA BLD CULT: NORMAL
BACTERIA BLD CULT: NORMAL
BUN SERPL-MCNC: 35 MG/DL (ref 6–23)
CALCIUM SERPL-MCNC: 8.2 MG/DL (ref 8.6–10.3)
CHLORIDE SERPL-SCNC: 106 MMOL/L (ref 98–107)
CO2 SERPL-SCNC: 26 MMOL/L (ref 21–32)
CREAT SERPL-MCNC: 0.89 MG/DL (ref 0.5–1.05)
EGFRCR SERPLBLD CKD-EPI 2021: 60 ML/MIN/1.73M*2
ERYTHROCYTE [DISTWIDTH] IN BLOOD BY AUTOMATED COUNT: 14.6 % (ref 11.5–14.5)
GLUCOSE SERPL-MCNC: 257 MG/DL (ref 74–99)
HCT VFR BLD AUTO: 31.5 % (ref 36–46)
HGB BLD-MCNC: 10 G/DL (ref 12–16)
MCH RBC QN AUTO: 28 PG (ref 26–34)
MCHC RBC AUTO-ENTMCNC: 31.7 G/DL (ref 32–36)
MCV RBC AUTO: 88 FL (ref 80–100)
NRBC BLD-RTO: 0 /100 WBCS (ref 0–0)
PLATELET # BLD AUTO: 232 X10*3/UL (ref 150–450)
POTASSIUM SERPL-SCNC: 3.9 MMOL/L (ref 3.5–5.3)
RBC # BLD AUTO: 3.57 X10*6/UL (ref 4–5.2)
SODIUM SERPL-SCNC: 141 MMOL/L (ref 136–145)
WBC # BLD AUTO: 15.6 X10*3/UL (ref 4.4–11.3)

## 2024-08-11 PROCEDURE — 80048 BASIC METABOLIC PNL TOTAL CA: CPT | Mod: IPSPLIT

## 2024-08-11 PROCEDURE — 1100000001 HC PRIVATE ROOM DAILY: Mod: IPSPLIT

## 2024-08-11 PROCEDURE — 99232 SBSQ HOSP IP/OBS MODERATE 35: CPT | Performed by: NURSE PRACTITIONER

## 2024-08-11 PROCEDURE — 94760 N-INVAS EAR/PLS OXIMETRY 1: CPT | Mod: IPSPLIT

## 2024-08-11 PROCEDURE — 2500000004 HC RX 250 GENERAL PHARMACY W/ HCPCS (ALT 636 FOR OP/ED): Mod: IPSPLIT

## 2024-08-11 PROCEDURE — 36415 COLL VENOUS BLD VENIPUNCTURE: CPT | Mod: IPSPLIT

## 2024-08-11 PROCEDURE — 2500000002 HC RX 250 W HCPCS SELF ADMINISTERED DRUGS (ALT 637 FOR MEDICARE OP, ALT 636 FOR OP/ED): Mod: IPSPLIT | Performed by: NURSE PRACTITIONER

## 2024-08-11 PROCEDURE — C9113 INJ PANTOPRAZOLE SODIUM, VIA: HCPCS | Mod: IPSPLIT

## 2024-08-11 PROCEDURE — 2500000001 HC RX 250 WO HCPCS SELF ADMINISTERED DRUGS (ALT 637 FOR MEDICARE OP): Mod: IPSPLIT | Performed by: NURSE PRACTITIONER

## 2024-08-11 PROCEDURE — 85027 COMPLETE CBC AUTOMATED: CPT | Mod: IPSPLIT

## 2024-08-11 PROCEDURE — 2500000004 HC RX 250 GENERAL PHARMACY W/ HCPCS (ALT 636 FOR OP/ED): Mod: IPSPLIT | Performed by: NURSE PRACTITIONER

## 2024-08-11 RX ORDER — CETIRIZINE HYDROCHLORIDE 10 MG/1
10 TABLET ORAL DAILY
Status: DISPENSED | OUTPATIENT
Start: 2024-08-11

## 2024-08-11 RX ORDER — AMOXICILLIN AND CLAVULANATE POTASSIUM 875; 125 MG/1; MG/1
1 TABLET, FILM COATED ORAL EVERY 12 HOURS SCHEDULED
Status: DISPENSED | OUTPATIENT
Start: 2024-08-11 | End: 2024-08-16

## 2024-08-11 RX ORDER — METRONIDAZOLE 250 MG/1
500 TABLET ORAL EVERY 8 HOURS SCHEDULED
Status: DISPENSED | OUTPATIENT
Start: 2024-08-11 | End: 2024-08-16

## 2024-08-11 RX ORDER — PREDNISONE 10 MG/1
10 TABLET ORAL DAILY
Status: ACTIVE | OUTPATIENT
Start: 2024-08-20 | End: 2024-08-23

## 2024-08-11 RX ORDER — PREDNISONE 20 MG/1
40 TABLET ORAL DAILY
Status: DISPENSED | OUTPATIENT
Start: 2024-08-11 | End: 2024-08-14

## 2024-08-11 RX ORDER — PREDNISONE 20 MG/1
20 TABLET ORAL DAILY
Status: ACTIVE | OUTPATIENT
Start: 2024-08-17 | End: 2024-08-20

## 2024-08-11 RX ADMIN — METRONIDAZOLE 500 MG: 5 INJECTION, SOLUTION INTRAVENOUS at 06:54

## 2024-08-11 RX ADMIN — APIXABAN 2.5 MG: 2.5 TABLET, FILM COATED ORAL at 21:21

## 2024-08-11 RX ADMIN — AZITHROMYCIN DIHYDRATE 500 MG: 250 TABLET ORAL at 08:28

## 2024-08-11 RX ADMIN — AMOXICILLIN AND CLAVULANATE POTASSIUM 1 TABLET: 875; 125 TABLET, FILM COATED ORAL at 10:17

## 2024-08-11 RX ADMIN — APIXABAN 2.5 MG: 2.5 TABLET, FILM COATED ORAL at 08:28

## 2024-08-11 RX ADMIN — METRONIDAZOLE 500 MG: 250 TABLET ORAL at 14:14

## 2024-08-11 RX ADMIN — PANTOPRAZOLE SODIUM 40 MG: 40 INJECTION, POWDER, FOR SOLUTION INTRAVENOUS at 08:28

## 2024-08-11 RX ADMIN — AMOXICILLIN AND CLAVULANATE POTASSIUM 1 TABLET: 875; 125 TABLET, FILM COATED ORAL at 21:21

## 2024-08-11 RX ADMIN — METRONIDAZOLE 500 MG: 250 TABLET ORAL at 21:21

## 2024-08-11 RX ADMIN — CETIRIZINE HYDROCHLORIDE 10 MG: 10 TABLET, FILM COATED ORAL at 10:17

## 2024-08-11 RX ADMIN — METHYLPREDNISOLONE SODIUM SUCCINATE 40 MG: 40 INJECTION, POWDER, FOR SOLUTION INTRAMUSCULAR; INTRAVENOUS at 01:32

## 2024-08-11 RX ADMIN — PREDNISONE 40 MG: 20 TABLET ORAL at 08:28

## 2024-08-11 ASSESSMENT — PAIN SCALES - GENERAL
PAINLEVEL_OUTOF10: 0 - NO PAIN

## 2024-08-11 ASSESSMENT — PAIN - FUNCTIONAL ASSESSMENT
PAIN_FUNCTIONAL_ASSESSMENT: 0-10
PAIN_FUNCTIONAL_ASSESSMENT: 0-10

## 2024-08-11 NOTE — PROGRESS NOTES
Sabina Cunha is a 89 y.o. female on day 2 of admission presenting with Pneumonia due to organism.      Subjective   Sabina is seen in her room sitting up in the chair eating her breakfast.   Continues with a congested cough, BLL rales, and watery eyes. IV ABX transitioned to Augmentin.   Denies any abdominal pain. Flagyl transitioned to oral.  DC IVF.  Will discuss with family any home going needs.        Objective     Last Recorded Vitals  /73 (BP Location: Right arm, Patient Position: Lying)   Pulse 67   Temp 37 °C (98.6 °F) (Temporal)   Resp 20   Wt 81 kg (178 lb 9.2 oz)   SpO2 94%   Intake/Output last 3 Shifts:    Intake/Output Summary (Last 24 hours) at 8/11/2024 0905  Last data filed at 8/11/2024 0600  Gross per 24 hour   Intake 1731.25 ml   Output --   Net 1731.25 ml       Admission Weight  Weight: 83 kg (182 lb 15.7 oz) (08/08/24 2056)    Daily Weight  08/10/24 : 81 kg (178 lb 9.2 oz)    Image Results  CT chest abdomen pelvis w IV contrast  Narrative: STUDY:  CT Chest, Abdomen, and Pelvis with IV Contrast; 8/9/2024 12:17 AM.  INDICATION:  Shortness of breath.  Constipation.  COMPARISON:  CXR 8/8/2024.  ACCESSION NUMBER(S):  [id number]  ORDERING CLINICIAN:  GABRIELA LIN  TECHNIQUE:  CT of the chest, abdomen, and pelvis was performed.  Contiguous axial  images were obtained at 3 mm slice thickness through the chest,  abdomen, and pelvis.  Coronal and sagittal reconstructions at 3 mm  slice thickness were performed.  Omnipaque 350 75 mL was administered  intravenously.    FINDINGS:  CHEST:  Mild cardiac enlargement.  No pericardial effusion.  Coronary artery  calcifications.  No aortic aneurysm or dissection.  No significant  mediastinal or hilar adenopathy.  No findings of pulmonary embolus.   Bilateral lower lobe airspace disease.  No pleural effusion.  No  pneumothorax.  Abdomen:  No acute abnormality of the stomach is identified.  The liver of normal size and contour.  Question 1.4 cm  hemangioma  RIGHT hepatic lobe.  No intrahepatic ductal dilatation is identified.   Gallbladder surgically absent.    No acute abnormality of the pancreas.  Duodenal diverticulum.  Spleen of normal appearance.    Fullness of the LEFT adrenal gland.  No discrete mass.  RIGHT adrenal  gland of normal appearance.  No acute renal process.   No retroperitoneal adenopathy.  Atherosclerosis of the abdominal aorta  and its branches.  Approximate 2 cm focal RIGHT lateral aneurysm  infrarenal abdominal aorta.  No findings of leak or rupture.  Thickening and inflammatory changes of the LEFT colon and sigmoid  colon.  There are diverticulum in this area without extraluminal gas  or abscess.  No portal venous gas identified.  No bowel obstruction.   No free air.  No free fluid.  Appendix is not identified.  No acute abnormality within its expected  location.  Pelvis:  There is a small amount of free fluid within the dependent pelvis.  No  pelvic abscess.  No pelvic adenopathy.  Mild to moderate rectosigmoid  stool burden.  Skeleton:  Spondylotic changes and facet arthrosis.  No acute bony abnormality  identified.  No acute bony process is identified.  Impression: Bilateral lower lobe pneumonia.  Inflammation and edema of the LEFT colon and sigmoid colon.  No bowel  obstruction.  No bowel wall pneumatosis or extraluminal gas.  No  abscess.  There are diverticulum of the sigmoid colon.  The more  diffuse nature of this appearance suggests colitis.  Unlikely  representing diverticulitis although concurrent diverticulitis  difficult to exclude.  2 cm focal RIGHT lateral infrarenal aortic aneurysm.  No findings of  leak or rupture.  No priors are available for comparison.  1.4 cm RIGHT hepatic hemangioma.  Mild fullness of the LEFT adrenal  gland.  No discrete adrenal mass.  Signed by Jonathan Carney MD  XR chest 1 view  Narrative: STUDY:  Chest Radiograph;  8/8/2024 10:54PM  INDICATION:  Shortness of breath.  COMPARISON:  [ SELECT  TYPE OF COMPARISON ]  ACCESSION NUMBER(S):  [id number]  ORDERING CLINICIAN:  GABRIELA LIN  TECHNIQUE:  Frontal chest was obtained at 22:53 hours.  FINDINGS:  Pacemaker on the right.  CARDIOMEDIASTINAL SILHOUETTE:  Cardiomediastinal silhouette is normal in size and configuration.     LUNGS:  Lungs are clear.     ABDOMEN:  No remarkable upper abdominal findings.     BONES:  No acute osseous changes.  Impression: No acute pulmonary abnormality.  Signed by Landon Alexis MD    Results for orders placed or performed during the hospital encounter of 08/08/24 (from the past 24 hour(s))   CBC   Result Value Ref Range    WBC 15.6 (H) 4.4 - 11.3 x10*3/uL    nRBC 0.0 0.0 - 0.0 /100 WBCs    RBC 3.57 (L) 4.00 - 5.20 x10*6/uL    Hemoglobin 10.0 (L) 12.0 - 16.0 g/dL    Hematocrit 31.5 (L) 36.0 - 46.0 %    MCV 88 80 - 100 fL    MCH 28.0 26.0 - 34.0 pg    MCHC 31.7 (L) 32.0 - 36.0 g/dL    RDW 14.6 (H) 11.5 - 14.5 %    Platelets 232 150 - 450 x10*3/uL   Basic metabolic panel   Result Value Ref Range    Glucose 257 (H) 74 - 99 mg/dL    Sodium 141 136 - 145 mmol/L    Potassium 3.9 3.5 - 5.3 mmol/L    Chloride 106 98 - 107 mmol/L    Bicarbonate 26 21 - 32 mmol/L    Anion Gap 13 10 - 20 mmol/L    Urea Nitrogen 35 (H) 6 - 23 mg/dL    Creatinine 0.89 0.50 - 1.05 mg/dL    eGFR 60 (L) >60 mL/min/1.73m*2    Calcium 8.2 (L) 8.6 - 10.3 mg/dL     Physical Exam  Constitutional:       Appearance: She is obese. She is not ill-appearing.   HENT:      Head: Atraumatic.      Nose: Nose normal.      Mouth/Throat:      Mouth: Mucous membranes are moist.   Eyes:      Extraocular Movements: Extraocular movements intact.      Pupils: Pupils are equal, round, and reactive to light.   Cardiovascular:      Rate and Rhythm: Normal rate and regular rhythm.      Pulses: Normal pulses.   Pulmonary:      Effort: Pulmonary effort is normal.      Breath sounds: Rales (BLL) present. Congested loose cough.  Abdominal:      General: Bowel sounds are normal.       "Palpations: Abdomen is soft.      Tenderness: Denies  Musculoskeletal:      Right lower leg: No edema.      Left lower leg: No edema.   Skin:     General: Skin is warm and dry.      Capillary Refill: Capillary refill takes less than 2 seconds.   Neurological:      Mental Status: She is alert.      Motor: Weakness present.      Comments: Very poor memory but is appropriate to situation.    Psychiatric:         Mood and Affect: Mood normal.         Behavior: Behavior normal.     Scheduled medications  amoxicillin-pot clavulanate, 1 tablet, oral, q12h MARIA  apixaban, 2.5 mg, oral, BID  azithromycin, 500 mg, oral, q24h MARIA  cetirizine, 10 mg, oral, Daily  metroNIDAZOLE, 500 mg, oral, q8h MARIA  pantoprazole, 40 mg, intravenous, Daily  predniSONE, 40 mg, oral, Daily   Followed by  [START ON 8/14/2024] predniSONE, 30 mg, oral, Daily   Followed by  [START ON 8/17/2024] predniSONE, 20 mg, oral, Daily   Followed by  [START ON 8/20/2024] predniSONE, 10 mg, oral, Daily      Continuous medications     PRN medications  PRN medications: ipratropium-albuteroL    Assessment/Plan      #PNA  ~CT chest and abdomen with bilateral lower lobe PNA  ~stable on room air  ~rocephin, zithromax, duoneb, solumedrol initiated  ~transition to Augmentin DC solumedrol to prednisone taper  ~initiate loratadine for her \"watery eyes\"     #Colitis  ~left sided abdominal pain with palpation  ~CT ABD inflammation and edema of the left colon and sigmoid colon without stool burden  ~fluid bolus in ED  ~LR 75  ~flagyl 500 IV Q8H  ~Flagyl PO~DC IVF     #Lactic acidosis  ~2/2 PNA and colitis  ~LA 3.1>4.2  ~fluid bolus   ~IVF LR 75/hr     #JET  ~2/2 PNA and colitis  ~IVF bolus X 2 LR 75  ~monitor renal function     #Generalized weakness  ~ progressive weakness over the past several days 2/2 PNA and colitis  ~PT/OT evaluations ordered     #Hematochezia  ~staff report rust colored stool  ~OCB +  ~no overt symptoms of bleeding  ~HGB 12.3  ~monitor " H/H  ~protonix for GI protection     #Cardiac  ~HTN, pacemaker, CAD s/p stent, HLD  #Alzheimer's dementia  #Seizures  #Osteoarthritis  #Obstructive sleep apnea     Disposition: Admitted 2/2 PNA and colitis. ES LOS > 2 midnights.      I spent 35 minutes in the professional and overall care of this patient.    Kathy Rizo, APRN-CNP

## 2024-08-11 NOTE — NURSING NOTE
Assumed care at this time, patient in bed awake, repositioned, IVF infusing per order without difficulty. Call light in reach.

## 2024-08-11 NOTE — CARE PLAN
The patient's goals for the shift include      The clinical goals for the shift include Patient will use call bell before getting up out of bed or chair through 1900    Over the shift, the patient did not use call light but yelled for help, daughter was chair side for most of the shift. Alarms on, patient denies pain and call light in reach.

## 2024-08-12 VITALS
DIASTOLIC BLOOD PRESSURE: 79 MMHG | SYSTOLIC BLOOD PRESSURE: 132 MMHG | OXYGEN SATURATION: 95 % | HEART RATE: 96 BPM | RESPIRATION RATE: 14 BRPM | HEIGHT: 65 IN | TEMPERATURE: 97.9 F | BODY MASS INDEX: 29.94 KG/M2 | WEIGHT: 179.68 LBS

## 2024-08-12 PROBLEM — J18.9 PNEUMONIA DUE TO ORGANISM: Status: RESOLVED | Noted: 2024-08-09 | Resolved: 2024-08-12

## 2024-08-12 LAB
ANION GAP SERPL CALC-SCNC: 12 MMOL/L (ref 10–20)
BUN SERPL-MCNC: 31 MG/DL (ref 6–23)
CALCIUM SERPL-MCNC: 7.8 MG/DL (ref 8.6–10.3)
CHLORIDE SERPL-SCNC: 104 MMOL/L (ref 98–107)
CO2 SERPL-SCNC: 27 MMOL/L (ref 21–32)
CREAT SERPL-MCNC: 0.89 MG/DL (ref 0.5–1.05)
EGFRCR SERPLBLD CKD-EPI 2021: 60 ML/MIN/1.73M*2
ERYTHROCYTE [DISTWIDTH] IN BLOOD BY AUTOMATED COUNT: 14.6 % (ref 11.5–14.5)
GLUCOSE SERPL-MCNC: 186 MG/DL (ref 74–99)
HCT VFR BLD AUTO: 33.9 % (ref 36–46)
HGB BLD-MCNC: 10.8 G/DL (ref 12–16)
HOLD SPECIMEN: NORMAL
LACTATE SERPL-SCNC: 2.1 MMOL/L (ref 0.4–2)
MCH RBC QN AUTO: 28.1 PG (ref 26–34)
MCHC RBC AUTO-ENTMCNC: 31.9 G/DL (ref 32–36)
MCV RBC AUTO: 88 FL (ref 80–100)
NRBC BLD-RTO: 0 /100 WBCS (ref 0–0)
PLATELET # BLD AUTO: 254 X10*3/UL (ref 150–450)
POTASSIUM SERPL-SCNC: 3.5 MMOL/L (ref 3.5–5.3)
RBC # BLD AUTO: 3.84 X10*6/UL (ref 4–5.2)
SODIUM SERPL-SCNC: 139 MMOL/L (ref 136–145)
WBC # BLD AUTO: 16.8 X10*3/UL (ref 4.4–11.3)

## 2024-08-12 PROCEDURE — 2500000004 HC RX 250 GENERAL PHARMACY W/ HCPCS (ALT 636 FOR OP/ED): Mod: IPSPLIT | Performed by: NURSE PRACTITIONER

## 2024-08-12 PROCEDURE — 83605 ASSAY OF LACTIC ACID: CPT | Mod: IPSPLIT | Performed by: STUDENT IN AN ORGANIZED HEALTH CARE EDUCATION/TRAINING PROGRAM

## 2024-08-12 PROCEDURE — 2500000001 HC RX 250 WO HCPCS SELF ADMINISTERED DRUGS (ALT 637 FOR MEDICARE OP): Mod: IPSPLIT | Performed by: NURSE PRACTITIONER

## 2024-08-12 PROCEDURE — 36415 COLL VENOUS BLD VENIPUNCTURE: CPT | Mod: IPSPLIT | Performed by: STUDENT IN AN ORGANIZED HEALTH CARE EDUCATION/TRAINING PROGRAM

## 2024-08-12 PROCEDURE — 2500000002 HC RX 250 W HCPCS SELF ADMINISTERED DRUGS (ALT 637 FOR MEDICARE OP, ALT 636 FOR OP/ED): Mod: IPSPLIT | Performed by: NURSE PRACTITIONER

## 2024-08-12 PROCEDURE — 82374 ASSAY BLOOD CARBON DIOXIDE: CPT | Mod: IPSPLIT | Performed by: NURSE PRACTITIONER

## 2024-08-12 PROCEDURE — 85027 COMPLETE CBC AUTOMATED: CPT | Mod: IPSPLIT | Performed by: NURSE PRACTITIONER

## 2024-08-12 PROCEDURE — 80048 BASIC METABOLIC PNL TOTAL CA: CPT | Mod: IPSPLIT | Performed by: NURSE PRACTITIONER

## 2024-08-12 PROCEDURE — 94760 N-INVAS EAR/PLS OXIMETRY 1: CPT | Mod: IPSPLIT

## 2024-08-12 PROCEDURE — 36415 COLL VENOUS BLD VENIPUNCTURE: CPT | Mod: IPSPLIT | Performed by: NURSE PRACTITIONER

## 2024-08-12 PROCEDURE — 99239 HOSP IP/OBS DSCHRG MGMT >30: CPT | Performed by: STUDENT IN AN ORGANIZED HEALTH CARE EDUCATION/TRAINING PROGRAM

## 2024-08-12 RX ORDER — AMOXICILLIN AND CLAVULANATE POTASSIUM 875; 125 MG/1; MG/1
1 TABLET, FILM COATED ORAL EVERY 12 HOURS SCHEDULED
Qty: 7 TABLET | Refills: 0 | Status: SHIPPED | OUTPATIENT
Start: 2024-08-12 | End: 2024-08-16

## 2024-08-12 RX ADMIN — APIXABAN 2.5 MG: 2.5 TABLET, FILM COATED ORAL at 08:22

## 2024-08-12 RX ADMIN — AZITHROMYCIN DIHYDRATE 500 MG: 250 TABLET ORAL at 08:23

## 2024-08-12 RX ADMIN — CETIRIZINE HYDROCHLORIDE 10 MG: 10 TABLET, FILM COATED ORAL at 08:23

## 2024-08-12 RX ADMIN — AMOXICILLIN AND CLAVULANATE POTASSIUM 1 TABLET: 875; 125 TABLET, FILM COATED ORAL at 08:22

## 2024-08-12 RX ADMIN — PREDNISONE 40 MG: 20 TABLET ORAL at 08:22

## 2024-08-12 RX ADMIN — METRONIDAZOLE 500 MG: 250 TABLET ORAL at 06:26

## 2024-08-12 ASSESSMENT — PAIN SCALES - GENERAL
PAINLEVEL_OUTOF10: 0 - NO PAIN
PAINLEVEL_OUTOF10: 0 - NO PAIN

## 2024-08-12 ASSESSMENT — PAIN - FUNCTIONAL ASSESSMENT: PAIN_FUNCTIONAL_ASSESSMENT: 0-10

## 2024-08-12 NOTE — DISCHARGE SUMMARY
Discharge Diagnosis  Pneumonia due to organism    Issues Requiring Follow-Up      Test Results Pending At Discharge  Pending Labs       Order Current Status    Blood Culture Preliminary result    Blood Culture Preliminary result            Hospital Course  Sabina Cunha is a 89 y.o. female  with PMHx significant for Alzheimer's dementia, HTN, pacemaker, seizures, anemia, CAD s/p stent, HLD, OA, MARYANN, who presented to [facility] due to progressive weakness over the past several days and subsequently passing out on the toilet and while playing solitaire. Family had reported moist cough with rattling in her chest. The patient admits to cough but no chest pain and no shortness of breath. She believes that she is having regular bowel movements but admits that her short term memory is very poor and she is unable to remember from day to day.     ED work up significant for JET and was given fluid bolus. Stool is positive for occult blood without overt bleeding and HGB is stable. CT chest and abdomen with bilateral lowe lobe PNA and inflammation and edema of the left colon and sigmoid colon without stool burden. She was initiated on azithromycin, solumedrol, and rocephin in the ED.     Patient discharged with 7 day course of Augmentin to complete treatment of PNA.    More than 35 minutes were spent in coordinating patient discharge.    Pertinent Physical Exam At Time of Discharge  Physical Exam  Vitals and nursing note reviewed.   Constitutional:       Appearance: Normal appearance.   HENT:      Head: Normocephalic and atraumatic.      Right Ear: Tympanic membrane and external ear normal.      Left Ear: Tympanic membrane and external ear normal.      Nose: Nose normal.      Mouth/Throat:      Mouth: Mucous membranes are moist.      Pharynx: Oropharynx is clear.   Eyes:      Extraocular Movements: Extraocular movements intact.      Conjunctiva/sclera: Conjunctivae normal.      Pupils: Pupils are equal, round, and reactive to  light.   Cardiovascular:      Rate and Rhythm: Normal rate and regular rhythm.      Pulses: Normal pulses.      Heart sounds: Normal heart sounds.   Pulmonary:      Effort: Pulmonary effort is normal.      Breath sounds: Normal breath sounds.   Abdominal:      General: Bowel sounds are normal.      Palpations: Abdomen is soft.   Musculoskeletal:      Cervical back: Normal range of motion and neck supple.   Skin:     General: Skin is warm and dry.   Neurological:      Mental Status: She is alert. Mental status is at baseline.   Psychiatric:         Mood and Affect: Mood normal.         Home Medications     Medication List      START taking these medications     amoxicillin-pot clavulanate 875-125 mg tablet; Commonly known as:   Augmentin; Take 1 tablet by mouth every 12 hours for 7 doses.     CONTINUE taking these medications     amLODIPine 5 mg tablet; Commonly known as: Norvasc   donepezil 10 mg tablet; Commonly known as: Aricept   Eliquis 2.5 mg tablet; Generic drug: apixaban   levETIRAcetam 750 mg tablet; Commonly known as: Keppra   lisinopriL-hydrochlorothiazide 20-12.5 mg tablet   metoprolol succinate  mg 24 hr tablet; Commonly known as:   Toprol-XL   Namenda 10 mg tablet; Generic drug: memantine       Outpatient Follow-Up  No future appointments.    Salvador Wright DO

## 2024-08-12 NOTE — CARE PLAN
The patient's goals for the shift include      The clinical goals for the shift include patient will utilize call light for assistance by end of shift    Over the shift, the patient did not make progress toward the following goals. Barriers to progression include cognition. Recommendations to address these barriers include reorient.    Problem: Pain - Adult  Goal: Verbalizes/displays adequate comfort level or baseline comfort level  8/12/2024 1147 by Emily Negron RN  Outcome: Not Progressing  8/12/2024 1147 by Emily Negron RN  Outcome: Not Progressing     Problem: Safety - Adult  Goal: Free from fall injury  8/12/2024 1147 by Emily Negron RN  Outcome: Not Progressing  8/12/2024 1147 by Emily Negron RN  Outcome: Not Progressing     Problem: Discharge Planning  Goal: Discharge to home or other facility with appropriate resources  8/12/2024 1147 by Emily Negron RN  Outcome: Not Progressing  8/12/2024 1147 by Emily Negron RN  Outcome: Not Progressing     Problem: Chronic Conditions and Co-morbidities  Goal: Patient's chronic conditions and co-morbidity symptoms are monitored and maintained or improved  8/12/2024 1147 by Emily Negron RN  Outcome: Not Progressing  8/12/2024 1147 by Emily Negron RN  Outcome: Not Progressing     Problem: Nutrition  Goal: Adequate PO fluid intake  8/12/2024 1147 by Emily Negron RN  Outcome: Not Progressing  8/12/2024 1147 by Emily Negron RN  Outcome: Not Progressing     Problem: Skin  Goal: Participates in plan/prevention/treatment measures  8/12/2024 1147 by Emily Negron RN  Outcome: Not Progressing  8/12/2024 1147 by Emily Negron RN  Outcome: Not Progressing

## 2024-08-12 NOTE — PROGRESS NOTES
08/12/24 1007   Discharge Planning   Living Arrangements Alone   Support Systems Family members;Home care staff  (aide 3xs/wk)   Assistance Needed Has intermittant supervision throughout the day with 24hr cameras. Needs [facility] for RN/PT/OT/aide servcies at disharge   Type of Residence Private residence   Who is requesting discharge planning? Patient   Home or Post Acute Services In home services   Type of Home Care Services Home health aide;Home nursing visits;Home OT;Home PT   Expected Discharge Disposition  Services   Does the patient need discharge transport arranged? No     TC with [facility]. PT lives alone but has intermittent attendants throughout the day with a family at dinner time. She gets MOWs and is able to heat up leftovers. Her VALE fills her pill keeper and they have cameras around the house to check on pt at night. She is open with [facility] for a nurse visit q 2wks and aide 3xs week under a waiver. She is weak from recent illnesses and would benefit from acute  referral for RN/PT/OT/aide services. Family would like acute services through [facility]. Referral started in [facility]. Dtr simona visit around noon daily and is available for transport at discharge. KIZZY Barcenas    Final orders for discharge sent to [facility]. KIZZY Barcenas

## 2024-08-13 LAB
ATRIAL RATE: 60 BPM
BACTERIA BLD CULT: NORMAL
BACTERIA BLD CULT: NORMAL
P OFFSET: 189 MS
P ONSET: 141 MS
PR INTERVAL: 190 MS
Q ONSET: 217 MS
QRS COUNT: 10 BEATS
QRS DURATION: 122 MS
QT INTERVAL: 490 MS
QTC CALCULATION(BAZETT): 490 MS
QTC FREDERICIA: 490 MS
R AXIS: 117 DEGREES
T AXIS: 68 DEGREES
T OFFSET: 462 MS
VENTRICULAR RATE: 60 BPM

## 2024-08-13 RX ORDER — MEMANTINE HYDROCHLORIDE AND DONEPEZIL HYDROCHLORIDE 28; 10 MG/1; MG/1
1 CAPSULE ORAL DAILY
Qty: 90 CAPSULE | Refills: 3 | Status: SHIPPED | OUTPATIENT
Start: 2024-08-13

## 2024-08-13 NOTE — TELEPHONE ENCOUNTER
Health Maintenance Due   Topic Date Due   • Pneumococcal Vaccine 0-64 (1 of 1 - PPSV23) 12/23/1970   • Shingles Vaccine (1 of 2) 12/23/2014       Patient is due for topics as listed above but is not proceeding with Immunization(s) Pneumococcal and Shingles at this time.           Requested Prescriptions     Pending Prescriptions Disp Refills    NAMZARIC 28-10 MG CP24 90 capsule 3     Sig: Take 1 tablet by mouth daily

## 2024-09-04 ENCOUNTER — APPOINTMENT (OUTPATIENT)
Dept: RADIOLOGY | Facility: HOSPITAL | Age: 89
End: 2024-09-04
Payer: MEDICARE

## 2024-09-04 ENCOUNTER — HOSPITAL ENCOUNTER (EMERGENCY)
Facility: HOSPITAL | Age: 89
Discharge: HOME | End: 2024-09-04
Attending: EMERGENCY MEDICINE
Payer: MEDICARE

## 2024-09-04 VITALS
OXYGEN SATURATION: 100 % | RESPIRATION RATE: 15 BRPM | BODY MASS INDEX: 29.49 KG/M2 | HEIGHT: 65 IN | DIASTOLIC BLOOD PRESSURE: 66 MMHG | WEIGHT: 177.03 LBS | SYSTOLIC BLOOD PRESSURE: 142 MMHG | HEART RATE: 59 BPM | TEMPERATURE: 97.4 F

## 2024-09-04 DIAGNOSIS — N39.0 URINARY TRACT INFECTION IN ELDERLY PATIENT: Primary | ICD-10-CM

## 2024-09-04 LAB
ALBUMIN SERPL BCP-MCNC: 3.9 G/DL (ref 3.4–5)
ALP SERPL-CCNC: 49 U/L (ref 33–136)
ALT SERPL W P-5'-P-CCNC: 15 U/L (ref 7–45)
ANION GAP SERPL CALC-SCNC: 13 MMOL/L (ref 10–20)
APPEARANCE UR: ABNORMAL
AST SERPL W P-5'-P-CCNC: 15 U/L (ref 9–39)
BACTERIA #/AREA URNS AUTO: ABNORMAL /HPF
BASOPHILS # BLD AUTO: 0.04 X10*3/UL (ref 0–0.1)
BASOPHILS NFR BLD AUTO: 0.7 %
BILIRUB SERPL-MCNC: 0.4 MG/DL (ref 0–1.2)
BILIRUB UR STRIP.AUTO-MCNC: NEGATIVE MG/DL
BUN SERPL-MCNC: 21 MG/DL (ref 6–23)
CALCIUM SERPL-MCNC: 9.4 MG/DL (ref 8.6–10.3)
CARDIAC TROPONIN I PNL SERPL HS: 9 NG/L (ref 0–13)
CHLORIDE SERPL-SCNC: 100 MMOL/L (ref 98–107)
CO2 SERPL-SCNC: 30 MMOL/L (ref 21–32)
COLOR UR: YELLOW
CREAT SERPL-MCNC: 0.9 MG/DL (ref 0.5–1.05)
EGFRCR SERPLBLD CKD-EPI 2021: 59 ML/MIN/1.73M*2
EOSINOPHIL # BLD AUTO: 0.17 X10*3/UL (ref 0–0.4)
EOSINOPHIL NFR BLD AUTO: 2.9 %
ERYTHROCYTE [DISTWIDTH] IN BLOOD BY AUTOMATED COUNT: 14.5 % (ref 11.5–14.5)
GLUCOSE SERPL-MCNC: 204 MG/DL (ref 74–99)
GLUCOSE UR STRIP.AUTO-MCNC: NEGATIVE MG/DL
HCT VFR BLD AUTO: 39.8 % (ref 36–46)
HGB BLD-MCNC: 12.3 G/DL (ref 12–16)
HOLD SPECIMEN: NORMAL
IMM GRANULOCYTES # BLD AUTO: 0.01 X10*3/UL (ref 0–0.5)
IMM GRANULOCYTES NFR BLD AUTO: 0.2 % (ref 0–0.9)
KETONES UR STRIP.AUTO-MCNC: NEGATIVE MG/DL
LEUKOCYTE ESTERASE UR QL STRIP.AUTO: ABNORMAL
LYMPHOCYTES # BLD AUTO: 1.49 X10*3/UL (ref 0.8–3)
LYMPHOCYTES NFR BLD AUTO: 25.6 %
MCH RBC QN AUTO: 28.1 PG (ref 26–34)
MCHC RBC AUTO-ENTMCNC: 30.9 G/DL (ref 32–36)
MCV RBC AUTO: 91 FL (ref 80–100)
MONOCYTES # BLD AUTO: 0.53 X10*3/UL (ref 0.05–0.8)
MONOCYTES NFR BLD AUTO: 9.1 %
MUCOUS THREADS #/AREA URNS AUTO: ABNORMAL /LPF
NEUTROPHILS # BLD AUTO: 3.57 X10*3/UL (ref 1.6–5.5)
NEUTROPHILS NFR BLD AUTO: 61.5 %
NITRITE UR QL STRIP.AUTO: POSITIVE
NRBC BLD-RTO: 0 /100 WBCS (ref 0–0)
PH UR STRIP.AUTO: 6 [PH]
PLATELET # BLD AUTO: 382 X10*3/UL (ref 150–450)
POTASSIUM SERPL-SCNC: 4 MMOL/L (ref 3.5–5.3)
PROT SERPL-MCNC: 6.9 G/DL (ref 6.4–8.2)
PROT UR STRIP.AUTO-MCNC: NEGATIVE MG/DL
RBC # BLD AUTO: 4.38 X10*6/UL (ref 4–5.2)
RBC # UR STRIP.AUTO: NEGATIVE /UL
RBC #/AREA URNS AUTO: ABNORMAL /HPF
SODIUM SERPL-SCNC: 139 MMOL/L (ref 136–145)
SP GR UR STRIP.AUTO: 1.02
SQUAMOUS #/AREA URNS AUTO: ABNORMAL /HPF
UROBILINOGEN UR STRIP.AUTO-MCNC: <2 MG/DL
WBC # BLD AUTO: 5.8 X10*3/UL (ref 4.4–11.3)
WBC #/AREA URNS AUTO: ABNORMAL /HPF

## 2024-09-04 PROCEDURE — 84484 ASSAY OF TROPONIN QUANT: CPT | Performed by: EMERGENCY MEDICINE

## 2024-09-04 PROCEDURE — 71045 X-RAY EXAM CHEST 1 VIEW: CPT | Performed by: STUDENT IN AN ORGANIZED HEALTH CARE EDUCATION/TRAINING PROGRAM

## 2024-09-04 PROCEDURE — 99283 EMERGENCY DEPT VISIT LOW MDM: CPT | Mod: 25

## 2024-09-04 PROCEDURE — 85025 COMPLETE CBC W/AUTO DIFF WBC: CPT | Performed by: EMERGENCY MEDICINE

## 2024-09-04 PROCEDURE — 36415 COLL VENOUS BLD VENIPUNCTURE: CPT | Performed by: EMERGENCY MEDICINE

## 2024-09-04 PROCEDURE — 96360 HYDRATION IV INFUSION INIT: CPT

## 2024-09-04 PROCEDURE — 2500000004 HC RX 250 GENERAL PHARMACY W/ HCPCS (ALT 636 FOR OP/ED): Performed by: EMERGENCY MEDICINE

## 2024-09-04 PROCEDURE — 81001 URINALYSIS AUTO W/SCOPE: CPT | Performed by: EMERGENCY MEDICINE

## 2024-09-04 PROCEDURE — 96361 HYDRATE IV INFUSION ADD-ON: CPT

## 2024-09-04 PROCEDURE — 71045 X-RAY EXAM CHEST 1 VIEW: CPT

## 2024-09-04 PROCEDURE — 80053 COMPREHEN METABOLIC PANEL: CPT | Performed by: EMERGENCY MEDICINE

## 2024-09-04 RX ORDER — CEPHALEXIN 500 MG/1
500 CAPSULE ORAL 4 TIMES DAILY
Qty: 28 CAPSULE | Refills: 0 | Status: SHIPPED | OUTPATIENT
Start: 2024-09-04 | End: 2024-09-11

## 2024-09-04 ASSESSMENT — PAIN - FUNCTIONAL ASSESSMENT: PAIN_FUNCTIONAL_ASSESSMENT: 0-10

## 2024-09-04 ASSESSMENT — PAIN SCALES - GENERAL
PAINLEVEL_OUTOF10: 0 - NO PAIN
PAINLEVEL_OUTOF10: 0 - NO PAIN

## 2024-09-04 ASSESSMENT — COLUMBIA-SUICIDE SEVERITY RATING SCALE - C-SSRS
2. HAVE YOU ACTUALLY HAD ANY THOUGHTS OF KILLING YOURSELF?: NO
6. HAVE YOU EVER DONE ANYTHING, STARTED TO DO ANYTHING, OR PREPARED TO DO ANYTHING TO END YOUR LIFE?: NO
1. IN THE PAST MONTH, HAVE YOU WISHED YOU WERE DEAD OR WISHED YOU COULD GO TO SLEEP AND NOT WAKE UP?: NO

## 2024-09-04 NOTE — ED PROVIDER NOTES
[facility]   ED  Provider Note  9/4/2024 10:25 AM  AC07/AC07      Chief Complaint   Patient presents with    Hypotension     Bp was reported to be under 100 systolic at home.        History of Present Illness:   Sabina Cunha is a 89 y.o. female presenting to the ED for hypotension, beginning this morning.  The complaint has been persistent, moderate in severity, and worsened by nothing.  Patient blood pressure taken at home and her systolic was less than 100.  Her daughter became concerned and sent to the ER for further testing.  EMS reports her blood pressure was in the normal range and not low.  Arrived in the ED her blood pressure is 136/87.  She denies any lightheadedness.  She denies any shortness of breath or chest pain.  She denies any weakness.  She has had no recent fever chills urinary burning or flank pain.  She states she is eating and drinking normally.      Review of Systems:   Pertinent positives and review of systems as noted above.  Remaining 10 review of systems is negative or noncontributory to today's episode of care.  Review of Systems       --------------------------------------------- PAST HISTORY ---------------------------------------------  Past Medical History: History reviewed. No pertinent past medical history.     Past Surgical History:   Past Surgical History:   Procedure Laterality Date    BREAST LUMPECTOMY  01/14/2016    Left Breast Lumpectomy    GALLBLADDER SURGERY  01/14/2016    Gallbladder Surgery    HYSTERECTOMY  01/14/2016    Hysterectomy    TOTAL KNEE ARTHROPLASTY  01/14/2016    Knee Replacement        Social History:   Social History     Social History Narrative    Not on file        Family History: family history is not on file. Unless otherwise noted, family history is non contributory    Patient's Medications   New Prescriptions    No medications on file   Previous Medications    AMLODIPINE (NORVASC) 5 MG TABLET    Take 1 tablet (5 mg) by mouth once daily.    DONEPEZIL  (ARICEPT) 10 MG TABLET    Take 1 tablet (10 mg) by mouth once daily.    ELIQUIS 2.5 MG TABLET    Take 1 tablet (2.5 mg) by mouth 2 times a day.    LEVETIRACETAM (KEPPRA) 750 MG TABLET    Take 1 tablet (750 mg) by mouth 2 times a day.    LISINOPRIL-HYDROCHLOROTHIAZIDE 20-12.5 MG TABLET    Take 1 tablet by mouth once daily.    MEMANTINE (NAMENDA) 10 MG TABLET    Take 1 tablet (10 mg) by mouth 2 times a day.    METOPROLOL SUCCINATE XL (TOPROL-XL) 100 MG 24 HR TABLET    Take 1 tablet (100 mg) by mouth once daily.   Modified Medications    No medications on file   Discontinued Medications    No medications on file      The patient’s home medications have been reviewed.    Allergies: Eggshell membrane, Oxycodone-acetaminophen, and Wheat    -------------------------------------------------- RESULTS -------------------------------------------------  All laboratory and radiology results have been personally reviewed by myself   LABS:  Labs Reviewed   CBC WITH AUTO DIFFERENTIAL - Abnormal       Result Value    WBC 5.8      nRBC 0.0      RBC 4.38      Hemoglobin 12.3      Hematocrit 39.8      MCV 91      MCH 28.1      MCHC 30.9 (*)     RDW 14.5      Platelets 382      Neutrophils % 61.5      Immature Granulocytes %, Automated 0.2      Lymphocytes % 25.6      Monocytes % 9.1      Eosinophils % 2.9      Basophils % 0.7      Neutrophils Absolute 3.57      Immature Granulocytes Absolute, Automated 0.01      Lymphocytes Absolute 1.49      Monocytes Absolute 0.53      Eosinophils Absolute 0.17      Basophils Absolute 0.04     COMPREHENSIVE METABOLIC PANEL - Abnormal    Glucose 204 (*)     Sodium 139      Potassium 4.0      Chloride 100      Bicarbonate 30      Anion Gap 13      Urea Nitrogen 21      Creatinine 0.90      eGFR 59 (*)     Calcium 9.4      Albumin 3.9      Alkaline Phosphatase 49      Total Protein 6.9      AST 15      Bilirubin, Total 0.4      ALT 15     URINALYSIS WITH REFLEX MICROSCOPIC - Abnormal    Color, Urine  Yellow      Appearance, Urine Hazy (*)     Specific Gravity, Urine 1.018      pH, Urine 6.0      Protein, Urine NEGATIVE      Glucose, Urine NEGATIVE      Blood, Urine NEGATIVE      Ketones, Urine NEGATIVE      Bilirubin, Urine NEGATIVE      Urobilinogen, Urine <2.0      Nitrite, Urine POSITIVE (*)     Leukocyte Esterase, Urine LARGE (3+) (*)    MICROSCOPIC ONLY, URINE - Abnormal    WBC, Urine 21-50 (*)     RBC, Urine NONE      Squamous Epithelial Cells, Urine 1-9 (SPARSE)      Bacteria, Urine 4+ (*)     Mucus, Urine FEW     SERIAL TROPONIN-INITIAL - Normal    Troponin I, High Sensitivity 9      Narrative:     Less than 99th percentile of normal range cutoff-  Female and children under 18 years old <14 ng/L; Male <21 ng/L: Negative  Repeat testing should be performed if clinically indicated.     Female and children under 18 years old 14-50 ng/L; Male 21-50 ng/L:  Consistent with possible cardiac damage and possible increased clinical   risk. Serial measurements may help to assess extent of myocardial damage.     >50 ng/L: Consistent with cardiac damage, increased clinical risk and  myocardial infarction. Serial measurements may help assess extent of   myocardial damage.      NOTE: Children less than 1 year old may have higher baseline troponin   levels and results should be interpreted in conjunction with the overall   clinical context.     NOTE: Troponin I testing is performed using a different   testing methodology at [facility] than at other   [facility] hospitals. Direct result comparisons should only   be made within the same method.   GRAY TOP    Extra Tube Hold for add-ons.     TROPONIN SERIES- (INITIAL, 1 HR)    Narrative:     The following orders were created for panel order Troponin Series, (0, 1 HR).  Procedure                               Abnormality         Status                     ---------                               -----------         ------                     Troponin I, High  "Sensiti...[[id number]]  Normal              Final result               Troponin, High Sensitivi...[[id number]]                                                   Please view results for these tests on the individual orders.   SERIAL TROPONIN, 1 HOUR     EKG: Paced rhythm with atrial pacing at 60 bpm, left posterior fascicular block, inferolateral ST wave inversions, no acute ST elevations.  Abnormal EKG.  Interpreted by WADE Cash MD    RADIOLOGY:  Interpreted by Radiologist.  XR chest 1 view   Final Result   1.  No evidence of acute cardiopulmonary process.             Signed by: Hal Hay 9/4/2024 11:23 AM   Dictation workstation:   [id number]          Encounter Date: 08/08/24   ECG 12 lead   Result Value    Ventricular Rate 60    Atrial Rate 60    PA Interval 190    QRS Duration 122    QT Interval 490    QTC Calculation(Bazett) 490    R Axis 117    T Axis 68    QRS Count 10    Q Onset 217    P Onset 141    P Offset 189    T Offset 462    QTC Fredericia 490    Narrative    Atrial-paced rhythm  Left posterior fascicular block  Possible Anterior infarct , age undetermined  Abnormal ECG  When compared with ECG of 17-OCT-2022 14:59,  Left posterior fascicular block is now Present  Nonspecific T wave abnormality no longer evident in Inferior leads  Nonspecific T wave abnormality no longer evident in Anterolateral leads  QT has lengthened  See ED provider note for full interpretation and clinical correlation  Confirmed by Dixie Yoon ([id number]) on 8/13/2024 4:48:53 PM     ------------------------- NURSING NOTES AND VITALS REVIEWED ---------------------------   The nursing notes within the ED encounter and vital signs as below have been reviewed.   /65   Pulse 61   Temp 36.3 °C (97.4 °F) (Tympanic)   Resp 15   Ht 1.651 m (5' 5\")   Wt 80.3 kg (177 lb 0.5 oz)   SpO2 100%   BMI 29.46 kg/m²   Oxygen Saturation Interpretation: Normal      ---------------------------------------------------PHYSICAL " EXAM--------------------------------------  Physical Exam   Constitutional/General: Alert,  well appearing, non toxic in NAD  Head: Normocephalic and atraumatic  Eyes: PERRL, EOMI, conjunctiva normal, sclera non icteric  Mouth: Oropharynx clear, handling secretions, no trismus, no asymmetry of the posterior oropharynx or uvular edema  Neck: Supple, full ROM, non tender to palpation in the midline, no stridor, no crepitus, no meningeal signs  Respiratory: Lungs clear to auscultation bilaterally, no wheezes, rales, or rhonchi. Not in respiratory distress  Cardiovascular:  Regular rate. Regular rhythm. No murmurs, gallops, or rubs. 2+ distal pulses  Chest: No chest wall tenderness  GI:  Abdomen Soft, Non tender, Non distended.  +BS. No organomegaly, no palpable masses,  No rebound, guarding, or rigidity.   Musculoskeletal: Moves all extremities x 4. Warm and well perfused, no clubbing, cyanosis, or edema. Capillary refill <3 seconds  Integument: skin warm and dry. No rashes.   Lymphatic: no lymphadenopathy noted  Neurologic: No focal deficits, symmetric strength 5/5 in the upper and lower extremities bilaterally  Psychiatric: Normal Affect    Procedures    ------------------------------ ED COURSE/MEDICAL DECISION MAKING----------------------  Diagnoses as of 09/04/24 1458   Urinary tract infection in elderly patient      Blood pressure has been normal ranging from 125 the 140 systolic here in the ED.  Her pulse ox is normal and her heart rate is about 60 bpm.  There is no evidence of acute dehydration.  Her GFR is actually improved from baseline.  Her CBC shows no acute abnormalities.  Her urinalysis shows 20-50 white cells per high-powered field and 4+ bacteria.  The patient was started on Keflex and advised follow-up with primary care doctor in 1 week.  I talked the daughter and answered her questions and advised her to encourage oral fluids for her mother.      Medical Decision Making:   Discharged to  home  Diagnoses as of 09/04/24 1458   Urinary tract infection in elderly patient      Counseling:   The emergency provider has spoken with the patient and family member patient and daughter and discussed today’s results, in addition to providing specific details for the plan of care and counseling regarding the diagnosis and prognosis.  Questions are answered at this time and they are agreeable with the plan.      --------------------------------- IMPRESSION AND DISPOSITION ---------------------------------        IMPRESSION  1. Urinary tract infection in elderly patient        DISPOSITION  Disposition: Discharge to home  Patient condition is fair      Billing Provider Critical Care Time: 0 minutes     Ridge Cash MD  09/04/24 1458       Ridge Cash MD  09/04/24 1459

## 2024-09-04 NOTE — DISCHARGE INSTRUCTIONS
Keflex as prescribed.    Encourage oral fluids.    Follow-up with her primary care doctor 1 week for recheck.    Return for worsening symptoms or concerns.

## 2024-09-05 ENCOUNTER — HOSPITAL ENCOUNTER (OUTPATIENT)
Dept: CARDIOLOGY | Facility: HOSPITAL | Age: 89
Discharge: HOME | End: 2024-09-05
Payer: MEDICARE

## 2024-09-05 LAB
ATRIAL RATE: 60 BPM
P AXIS: 57 DEGREES
P OFFSET: 185 MS
P ONSET: 139 MS
PR INTERVAL: 192 MS
Q ONSET: 216 MS
QRS COUNT: 10 BEATS
QRS DURATION: 122 MS
QT INTERVAL: 494 MS
QTC CALCULATION(BAZETT): 494 MS
QTC FREDERICIA: 494 MS
R AXIS: 120 DEGREES
T AXIS: 155 DEGREES
T OFFSET: 463 MS
VENTRICULAR RATE: 60 BPM

## 2024-09-05 PROCEDURE — 93005 ELECTROCARDIOGRAM TRACING: CPT

## 2025-02-13 ENCOUNTER — HOSPITAL ENCOUNTER (OUTPATIENT)
Dept: RADIOLOGY | Facility: HOSPITAL | Age: OVER 89
Discharge: HOME | End: 2025-02-13
Payer: MEDICARE

## 2025-02-13 DIAGNOSIS — M79.606 LEG PAIN: ICD-10-CM

## 2025-02-13 PROCEDURE — 73630 X-RAY EXAM OF FOOT: CPT | Mod: LT

## 2025-02-13 PROCEDURE — 73590 X-RAY EXAM OF LOWER LEG: CPT | Mod: LT

## 2025-02-13 PROCEDURE — 73552 X-RAY EXAM OF FEMUR 2/>: CPT | Mod: LT

## 2025-02-13 PROCEDURE — 73610 X-RAY EXAM OF ANKLE: CPT | Mod: LT

## 2025-02-17 ENCOUNTER — TELEPHONE (OUTPATIENT)
Dept: ADMINISTRATIVE | Age: 89
End: 2025-02-17

## 2025-02-17 ENCOUNTER — TELEMEDICINE (OUTPATIENT)
Age: 89
End: 2025-02-17
Payer: MEDICARE

## 2025-02-17 DIAGNOSIS — F02.B0 MODERATE LATE ONSET ALZHEIMER'S DEMENTIA WITHOUT BEHAVIORAL DISTURBANCE, PSYCHOTIC DISTURBANCE, MOOD DISTURBANCE, OR ANXIETY (HCC): Primary | ICD-10-CM

## 2025-02-17 DIAGNOSIS — D47.2 MGUS (MONOCLONAL GAMMOPATHY OF UNKNOWN SIGNIFICANCE): ICD-10-CM

## 2025-02-17 DIAGNOSIS — G30.1 MODERATE LATE ONSET ALZHEIMER'S DEMENTIA WITHOUT BEHAVIORAL DISTURBANCE, PSYCHOTIC DISTURBANCE, MOOD DISTURBANCE, OR ANXIETY (HCC): Primary | ICD-10-CM

## 2025-02-17 DIAGNOSIS — R20.0 NUMBNESS IN BOTH LEGS: ICD-10-CM

## 2025-02-17 DIAGNOSIS — G40.919 INTRACTABLE EPILEPSY WITHOUT STATUS EPILEPTICUS, UNSPECIFIED EPILEPSY TYPE (HCC): ICD-10-CM

## 2025-02-17 PROCEDURE — 1159F MED LIST DOCD IN RCRD: CPT | Performed by: PSYCHIATRY & NEUROLOGY

## 2025-02-17 PROCEDURE — 99214 OFFICE O/P EST MOD 30 MIN: CPT | Performed by: PSYCHIATRY & NEUROLOGY

## 2025-02-17 PROCEDURE — 1123F ACP DISCUSS/DSCN MKR DOCD: CPT | Performed by: PSYCHIATRY & NEUROLOGY

## 2025-02-17 RX ORDER — MEMANTINE HYDROCHLORIDE AND DONEPEZIL HYDROCHLORIDE 28; 10 MG/1; MG/1
1 CAPSULE ORAL DAILY
Qty: 90 CAPSULE | Refills: 3 | Status: SHIPPED | OUTPATIENT
Start: 2025-02-17

## 2025-02-17 RX ORDER — LEVETIRACETAM 750 MG/1
750 TABLET ORAL 2 TIMES DAILY
Qty: 180 TABLET | Refills: 3 | Status: SHIPPED | OUTPATIENT
Start: 2025-02-17

## 2025-02-17 NOTE — PROGRESS NOTES
Tamra Lr MD       Latisha Streeter is a 89 y.o. 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  Getting meals on wheels    Current Medications being taken: namzaric 28/10 mg q daily  Mvi, b12.    Any Improvement: stable. Playing solitaire  Getting aide- bathing, dressing  Warms food in microwaving    Any Side Effects: NO  Which of the following Activities of Daily Living is the patient able to complete:able to complete activities of daily living- does not want to be groomed except when she steps out of home.  Which of the following Instrumental Activities is the patient able to complete: not driving due to family's request, able to dial a telephone, eats readymade meals mostly, gets meals on meals.  Cameras on the house.  Pill container organized by family and she takes it mostly    Patient lives: independently  Home health comes in 5/week  The patient lives:in a single home  History of Any Mood Changes: had anxiety, with shower.   History of Any Personality Changes: No  History of Any Problem with Sleep: not using cpap.  - sleeping ok   Takes a nap in afternoon    History of Any Visual Hallucination: no  History of Any Weakness:None  History of Any Numbness:c/o tingling in feet  History of Seizures: yes.  History of Tremors:None  History of Rigidity:None.  Weight stable. Eating well   Falls: none      Epilepsy:  Date of last seizure: possibly in may 2014  Unsure if shes taking the keppra or not    Breakthrough episode: ?? On